# Patient Record
Sex: FEMALE | Race: WHITE | NOT HISPANIC OR LATINO | Employment: FULL TIME | URBAN - METROPOLITAN AREA
[De-identification: names, ages, dates, MRNs, and addresses within clinical notes are randomized per-mention and may not be internally consistent; named-entity substitution may affect disease eponyms.]

---

## 2020-08-23 ENCOUNTER — APPOINTMENT (OUTPATIENT)
Dept: URGENT CARE | Facility: URGENT CARE | Age: 25
End: 2020-08-23
Payer: COMMERCIAL

## 2020-08-23 ENCOUNTER — RESULTS ONLY (OUTPATIENT)
Dept: LAB | Age: 25
End: 2020-08-23

## 2020-08-24 LAB
SARS-COV-2 RNA SPEC QL NAA+PROBE: NOT DETECTED
SPECIMEN SOURCE: NORMAL

## 2021-01-04 ENCOUNTER — HEALTH MAINTENANCE LETTER (OUTPATIENT)
Age: 26
End: 2021-01-04

## 2021-05-21 ENCOUNTER — OFFICE VISIT (OUTPATIENT)
Dept: FAMILY MEDICINE | Facility: CLINIC | Age: 26
End: 2021-05-21
Payer: COMMERCIAL

## 2021-05-21 VITALS
WEIGHT: 151 LBS | HEART RATE: 51 BPM | TEMPERATURE: 98.8 F | HEIGHT: 67 IN | SYSTOLIC BLOOD PRESSURE: 99 MMHG | DIASTOLIC BLOOD PRESSURE: 60 MMHG | RESPIRATION RATE: 16 BRPM | BODY MASS INDEX: 23.7 KG/M2 | OXYGEN SATURATION: 98 %

## 2021-05-21 DIAGNOSIS — Z30.09 ENCOUNTER FOR OTHER GENERAL COUNSELING OR ADVICE ON CONTRACEPTION: Primary | ICD-10-CM

## 2021-05-21 PROBLEM — L70.9 ACNE: Status: ACTIVE | Noted: 2017-08-09

## 2021-05-21 PROCEDURE — 99203 OFFICE O/P NEW LOW 30 MIN: CPT | Performed by: NURSE PRACTITIONER

## 2021-05-21 RX ORDER — CLINDAMYCIN PHOSPHATE 10 UG/ML
LOTION TOPICAL
COMMUNITY
Start: 2020-08-25 | End: 2021-07-06

## 2021-05-21 RX ORDER — NORGESTIMATE AND ETHINYL ESTRADIOL 7DAYSX3 28
KIT ORAL
COMMUNITY
Start: 2021-04-26 | End: 2021-06-04

## 2021-05-21 RX ORDER — TRETINOIN 0.5 MG/G
CREAM TOPICAL
COMMUNITY
Start: 2020-08-25 | End: 2021-07-06

## 2021-05-21 RX ORDER — MAGNESIUM GLUCONATE 30 MG(550)
30 TABLET ORAL
COMMUNITY

## 2021-05-21 SDOH — HEALTH STABILITY: MENTAL HEALTH: HOW OFTEN DO YOU HAVE 6 OR MORE DRINKS ON ONE OCCASION?: NOT ASKED

## 2021-05-21 SDOH — HEALTH STABILITY: MENTAL HEALTH: HOW MANY STANDARD DRINKS CONTAINING ALCOHOL DO YOU HAVE ON A TYPICAL DAY?: NOT ASKED

## 2021-05-21 SDOH — HEALTH STABILITY: MENTAL HEALTH: HOW OFTEN DO YOU HAVE A DRINK CONTAINING ALCOHOL?: NOT ASKED

## 2021-05-21 ASSESSMENT — MIFFLIN-ST. JEOR: SCORE: 1447.56

## 2021-05-21 NOTE — PROGRESS NOTES
"    Assessment & Plan     Encounter for other general counseling or advice on contraception  Patient interested in Paragard.  Counseled.  Patient is scheduled for 6/4.  Advised on need for UPT upon arrival and on ibuprofen prior to procedure. Will do pap smear at time of visit.     15 minutes spent on the date of the encounter doing chart review, history and exam, documentation and further activities per the note         Return in about 2 weeks (around 6/4/2021) for IUD insertion.    OVIDIO Denton CNP  [facility]    Amadou Hatfield is a 26 year old who presents for the following health issues     HPI     IUD Consult.        Review of Systems   Constitutional, HEENT, cardiovascular, pulmonary, gi and gu systems are negative, except as otherwise noted.      Objective    BP 99/60 (BP Location: Left arm, Patient Position: Sitting, Cuff Size: Adult Regular)   Pulse 51   Temp 98.8  F (37.1  C) (Tympanic)   Resp 16   Ht 1.702 m (5' 7\")   Wt 68.5 kg (151 lb)   LMP 05/17/2021 (Approximate)   SpO2 98%   BMI 23.65 kg/m    Body mass index is 23.65 kg/m .  Physical Exam   GENERAL: healthy, alert and no distress  PSYCH: mentation appears normal, affect normal/bright            "

## 2021-05-21 NOTE — PATIENT INSTRUCTIONS
At [facility], we strive to deliver an exceptional experience to you, every time we see you. If you receive a survey, please complete it as we do value your feedback.  If you have MyChart, you can expect to receive results automatically within 24 hours of their completion.  Your provider will send a note interpreting your results as well.   If you do not have MyChart, you should receive your results in about a week by mail.    Your care team:                            Family Medicine Internal Medicine   MD Earle Palacio MD Shantel Branch-Fleming, MD Srinivasa Vaka, MD Katya Belousova, PARENE Boateng, APRN CNP    Avinash Paul, MD Pediatrics   Vasu Go, PARENE Lerma, CNP MD Rebecca Goldberg APRN CNP   MD Tita Rome MD Deborah Mielke, MD Traci Naidu, APRN [facility]      Clinic hours: Monday - Thursday 7 am-6 pm; Fridays 7 am-5 pm.   Urgent care: Monday - Friday 10 am- 8 pm; Saturday and Sunday 9 am-5 pm.    Clinic: [phone]       [facility]: Monday - Thursday 8 am - 7 pm; Friday 8 am - 6 pm  [facility]: [phone]     Use [email or web address] for 24/7 diagnosis and treatment of dozens of conditions.

## 2021-06-04 ENCOUNTER — OFFICE VISIT (OUTPATIENT)
Dept: FAMILY MEDICINE | Facility: CLINIC | Age: 26
End: 2021-06-04
Payer: COMMERCIAL

## 2021-06-04 VITALS
WEIGHT: 150 LBS | HEART RATE: 60 BPM | HEIGHT: 67 IN | SYSTOLIC BLOOD PRESSURE: 106 MMHG | BODY MASS INDEX: 23.54 KG/M2 | OXYGEN SATURATION: 99 % | TEMPERATURE: 99 F | DIASTOLIC BLOOD PRESSURE: 71 MMHG

## 2021-06-04 DIAGNOSIS — Z12.4 SCREENING FOR MALIGNANT NEOPLASM OF CERVIX: ICD-10-CM

## 2021-06-04 DIAGNOSIS — Z97.5 IUD (INTRAUTERINE DEVICE) IN PLACE: ICD-10-CM

## 2021-06-04 DIAGNOSIS — Z30.430 ENCOUNTER FOR IUD INSERTION: Primary | ICD-10-CM

## 2021-06-04 LAB — HCG UR QL: NEGATIVE

## 2021-06-04 PROCEDURE — G0145 SCR C/V CYTO,THINLAYER,RESCR: HCPCS | Performed by: NURSE PRACTITIONER

## 2021-06-04 PROCEDURE — 58300 INSERT INTRAUTERINE DEVICE: CPT | Performed by: NURSE PRACTITIONER

## 2021-06-04 PROCEDURE — 99207 PR DROP WITH A PROCEDURE: CPT | Performed by: NURSE PRACTITIONER

## 2021-06-04 PROCEDURE — 81025 URINE PREGNANCY TEST: CPT | Performed by: NURSE PRACTITIONER

## 2021-06-04 RX ORDER — COPPER 313.4 MG/1
1 INTRAUTERINE DEVICE INTRAUTERINE CONTINUOUS
Status: ACTIVE
Start: 2021-06-04

## 2021-06-04 RX ORDER — COPPER 313.4 MG/1
1 INTRAUTERINE DEVICE INTRAUTERINE CONTINUOUS
Start: 2021-06-04

## 2021-06-04 RX ADMIN — COPPER 1 EACH: 313.4 INTRAUTERINE DEVICE INTRAUTERINE at 14:13

## 2021-06-04 ASSESSMENT — MIFFLIN-ST. JEOR: SCORE: 1443.03

## 2021-06-04 NOTE — PROGRESS NOTES
Assessment & Plan     Encounter for IUD insertion  See procedure note.   - HCG Qual, Urine ([id number])  - INSERTION INTRAUTERINE DEVICE  - paragard intrauterine copper device; 1 each by Intrauterine route continuous  - paragard intrauterine copper IUD device 1 each    Screening for malignant neoplasm of cervix  - Pap imaged thin layer screen reflex to HPV if ASCUS - recommend age 25 - 29    Prescription drug management  25 minutes spent on the date of the encounter doing chart review, history and exam, documentation and further activities per the note       Patient Instructions   We placed an IUD (intrauterine device) today for birth control.  You may experience some cramping like a period for the next few days and spotting for the next month or so.    After that, if you have the Copper IUD, your period pattern should normalize though it may be heavier and the cramping stronger than normal.  This is normal and you can use ibuprofen over the counter to help with that.      Nothing in the vagina (tampons, intercourse, etc.) for the next 48 hours.    If you have the hormonal IUD (Mirena, Liletta, Merry) your bleeding pattern will remain irregular but lessen over time.  The first month, you may spot more often.  Some women don't get their period at all, some will get it every so often, but it will not be a regular, monthly cycle like you may be used to.    I recommend using condoms for the next seven days to protect against pregnancy and always to protect against sexually transmitted infections.      The risk for expelling the IUD is highest in the first month, therefore, come back in 4 weeks for a string check.  You should also check for the strings regularly yourself.    Congratulations on choosing such an effective birth control method!  Please contact us at any time with questions or concerns!        Return in about 4 weeks (around 7/2/2021) for string check.    OVIDIO Denton [facility]  "WELLINGTON Hatfield is a 26 year old who presents for the following health issues     HPI     IUD placement, Paragard, previously counseled by myself.       Review of Systems   Constitutional, HEENT, cardiovascular, pulmonary, gi and gu systems are negative, except as otherwise noted.      Objective    /71 (BP Location: Left arm, Patient Position: Sitting, Cuff Size: Adult Regular)   Pulse 60   Temp 99  F (37.2  C) (Tympanic)   Ht 1.702 m (5' 7\")   Wt 68 kg (150 lb)   LMP 06/02/2021 (Approximate)   SpO2 99%   BMI 23.49 kg/m    Body mass index is 23.49 kg/m .  Physical Exam   GENERAL: healthy, alert and no distress   (female): normal female external genitalia, normal urethral meatus, vaginal mucosa, normal cervix/adnexa/uterus without masses or discharge  PSYCH: mentation appears normal, affect normal/bright    No results found for this or any previous visit (from the past 24 hour(s)).            "

## 2021-06-04 NOTE — PATIENT INSTRUCTIONS
We placed an IUD (intrauterine device) today for birth control.  You may experience some cramping like a period for the next few days and spotting for the next month or so.    After that, if you have the Copper IUD, your period pattern should normalize though it may be heavier and the cramping stronger than normal.  This is normal and you can use ibuprofen over the counter to help with that.      Nothing in the vagina (tampons, intercourse, etc.) for the next 48 hours.    If you have the hormonal IUD (Mirena, Liletta, Merry) your bleeding pattern will remain irregular but lessen over time.  The first month, you may spot more often.  Some women don't get their period at all, some will get it every so often, but it will not be a regular, monthly cycle like you may be used to.    I recommend using condoms for the next seven days to protect against pregnancy and always to protect against sexually transmitted infections.      The risk for expelling the IUD is highest in the first month, therefore, come back in 4 weeks for a string check.  You should also check for the strings regularly yourself.    Congratulations on choosing such an effective birth control method!  Please contact us at any time with questions or concerns!

## 2021-06-04 NOTE — PROCEDURES
The patient meets and is agreeable to the following conditions:    She is not interested in conception in the near future.  She currently is in a stable, monogamous relationship.  There is no previous history of pelvic inflammatory disease.  There is no previous history of ectopic pregnancy.  She is willing to check monthly for the IUD string.  She is at least 8 weeks post-partum.  There is no history of unresolved abnormal uterine bleeding.  There is no history of an unresolved abnormal PAP smear.  She has no history of Diaz's disease or an allergy to copper (for ParaGard).  She has no history of diabetes, AIDS, leukemia, IV drug use or chronic steroid use.  She is willing to return annually for pelvic exams  She denies the possibility of pregnancy.  Pregnancy test today is negative.    The following risks were discussed with the patient:  Possibility of pregnancy and ectopic pregnancy.  Possibility of pelvic inflammatory disease, particularly with new partners.  Risk of uterine perforation or IUD expulsion.  Possibility of difficult removal.  Spotting or heavy bleeding.  Cramping, pain or infection during or after insertion.    The patient was given patient information on the IUD and the patient education brochure from the .  The patient has given consent to proceed with placement of the IUD.  A written consent form is present in the chart.  She wishes to proceed.    PROCEDURE:  IUD Placement    Type of IUD: ParaGard    The patient was placed in a dorsal lithotomy potion and a bimanual exam was performed to determine the position of the uterus.  The speculum was placed, cervix was identified and cleaned with betadine three times.   A single tooth tenaculum was applied to the anterior lip of the cervix for stabilization.  The uterus was sounded to 7.5 cm and removed. (Target sound depth is 6.5 cm to 8.5 cm.)   The IUD insertion device was inserted without difficulty into the uterus to manufacturers  recommendationsunder sterile conditions to the sounding depth. The IUD string was then cut to 2.0 cm.    The patient tolerated this procedure without immediate complication and minimal bleeding.  The patient is to return or call immediately for any unexplained fever, abdominal or pelvic pain, excessive bleeding, possibility of pregnancy, foul-smelling discharge, sense that the IUD has been expelled.    IUD DEVICE:  LOT # [id number]  EXP Date 01/2026    NDC:    MICHELLE:  [id number]    OVIDIO Denton CNP

## 2021-06-08 LAB
COPATH REPORT: NORMAL
PAP: NORMAL

## 2021-06-24 ENCOUNTER — VIRTUAL VISIT (OUTPATIENT)
Dept: DERMATOLOGY | Facility: CLINIC | Age: 26
End: 2021-06-24
Payer: COMMERCIAL

## 2021-06-24 DIAGNOSIS — L70.0 ACNE VULGARIS: Primary | ICD-10-CM

## 2021-06-24 PROCEDURE — 99204 OFFICE O/P NEW MOD 45 MIN: CPT | Mod: GQ

## 2021-06-24 NOTE — PROGRESS NOTES
[facility] Dermatology Note  Encounter Date: Jun 24, 2021  Store-and-Forward and Telephone ([phone]). Location of teledermatologist: [facility] [place].  Start time: 0900. End time: 0921.     Dermatology Problem List:  # Acne vulgaris, inflammatory and comedonal   - BPW, tretinoin 0.025%, spironolactone    ____________________________________________    Assessment & Plan:     # Acne vulgaris.  Inflammatory, comedonal, hormonal.  Patient has had acne on and off since puberty.  She recently stopped OCP and started nonhormonal IUD and thinks it has gotten worse.  Especially has worsened since November.  Does not necessarily describe a hormonal component, but the distribution raises suspicion.  Considered doxy, accutane, spironolactone in addition to topicals.  After discussing with patient, we decided to start spironolactone, wait 6 months to assess response and if necessary can consider accutane at that point.   - start BP wash at least every other day   - start tretinoin 0.025% at bedtime  - start spironolactone 50 mg x2 weeks then 100 mg at bedtime   - future: if not clear in 6 months consider accutane  - discussed importance of continued use of sunscreen to help with PIH and scarring       Procedures Performed:    None    Follow-up: 3 month(s) in-person, or earlier for new or changing lesions    Staff and Resident:     Lizett Palmer MD     STAFF: OLIVE     Staff Physician Comments:   I evaluated any available patient photographs with the resident/medical student and I edited the assessment and plan as documented in the note. I was present on the line and participated in the entire telephone call/video visit.    Rashel Singh MD   of Dermatology  Department of Dermatology  [employer]    ____________________________________________    CC: Acne (Alysia is here for treatment of acne. She says it is  the worst its ever been.)    HPI:  Ms. Alysia Cordoba is a(n) 26 year old female who presents today as a new patient for acne.  Has always had acne prone skin.  Tried accutane in HS but did not complete full course.  Improved with OCP but recently stopped for a nonhormonal IUD.  Has tried various topicals including BPW, tretinoin and clindamycin.  Currently is not using anything medicated.  Wears daily sunscreen.  Does not necessarily think it is much worse with period.  Does not want to start with accutane.       Patient is otherwise feeling well, without additional skin concerns.      Labs Reviewed:  N/A    Physical Exam:  Vitals: LMP 06/02/2021 (Approximate)   SKIN: Teledermatology photos were reviewed; image quality and interpretability: acceptable. Image date: 6/22/21.  - inflammatory papules with PIH and possible scarring on the cheeks, jawline  - No other lesions of concern on areas examined.      Medications:  Current Outpatient Medications   Medication     cholecalciferol 50 MCG (2000 UT) CAPS     Ferrous Sulfate (IRON PO)     Magnesium Gluconate 550 MG TABS     Multiple Vitamins-Minerals (ZINC PO)     paragard intrauterine copper device     clindamycin (CLEOCIN T) 1 % external lotion     tretinoin (RETIN-A) 0.05 % external cream     Current Facility-Administered Medications   Medication     paragard intrauterine copper IUD device 1 each      Past Medical/Surgical History:   Patient Active Problem List   Diagnosis     Acne     IUD (intrauterine device) in place     No past medical history on file.    CC Referred Self, MD  No address on file on close of this encounter.

## 2021-06-24 NOTE — NURSING NOTE
Dermatology Rooming Note    Alysia Cordoba's goals for this visit include:   Chief Complaint   Patient presents with     Acne     Alysia is here for treatment of acne. She says it is the worst its ever been.     Teresa Cook, CMA

## 2021-06-24 NOTE — LETTER
6/24/2021       RE: Alysia Cordoba  [address] [address] [place] [address]     Dear Colleague,    Thank you for referring your patient, Alysia Cordoba, to the [facility] [place] at [facility]. Please see a copy of my visit note below.    [facility] Dermatology Note  Encounter Date: Jun 24, 2021  Store-and-Forward and Telephone ([phone]). Location of teledermatologist: [facility] [place].  Start time: 0900. End time: 0921.     Dermatology Problem List:  # Acne vulgaris, inflammatory and comedonal   - BPW, tretinoin 0.025%, spironolactone    ____________________________________________    Assessment & Plan:     # Acne vulgaris.  Inflammatory, comedonal, hormonal.  Patient has had acne on and off since puberty.  She recently stopped OCP and started nonhormonal IUD and thinks it has gotten worse.  Especially has worsened since November.  Does not necessarily describe a hormonal component, but the distribution raises suspicion.  Considered doxy, accutane, spironolactone in addition to topicals.  After discussing with patient, we decided to start spironolactone, wait 6 months to assess response and if necessary can consider accutane at that point.   - start BP wash at least every other day   - start tretinoin 0.025% at bedtime  - start spironolactone 50 mg x2 weeks then 100 mg at bedtime   - future: if not clear in 6 months consider accutane  - discussed importance of continued use of sunscreen to help with PIH and scarring       Procedures Performed:    None    Follow-up: 3 month(s) in-person, or earlier for new or changing lesions    Staff and Resident:     Lizett Palmer MD     STAFF: OLIVE     Staff Physician Comments:   I evaluated any available patient photographs with the resident/medical student and I edited the assessment and plan as documented in the note. I was present on  the line and participated in the entire telephone call/video visit.    Rashel Singh MD   of Dermatology  Department of Dermatology  [facility] [employer]    ____________________________________________    CC: Acne (Alysia is here for treatment of acne. She says it is the worst its ever been.)    HPI:  Ms. Alysia Cordoba is a(n) 26 year old female who presents today as a new patient for acne.  Has always had acne prone skin.  Tried accutane in HS but did not complete full course.  Improved with OCP but recently stopped for a nonhormonal IUD.  Has tried various topicals including BPW, tretinoin and clindamycin.  Currently is not using anything medicated.  Wears daily sunscreen.  Does not necessarily think it is much worse with period.  Does not want to start with accutane.       Patient is otherwise feeling well, without additional skin concerns.      Labs Reviewed:  N/A    Physical Exam:  Vitals: LMP 06/02/2021 (Approximate)   SKIN: Teledermatology photos were reviewed; image quality and interpretability: acceptable. Image date: 6/22/21.  - inflammatory papules with PIH and possible scarring on the cheeks, jawline  - No other lesions of concern on areas examined.      Medications:  Current Outpatient Medications   Medication     cholecalciferol 50 MCG (2000 UT) CAPS     Ferrous Sulfate (IRON PO)     Magnesium Gluconate 550 MG TABS     Multiple Vitamins-Minerals (ZINC PO)     paragard intrauterine copper device     clindamycin (CLEOCIN T) 1 % external lotion     tretinoin (RETIN-A) 0.05 % external cream     Current Facility-Administered Medications   Medication     paragard intrauterine copper IUD device 1 each      Past Medical/Surgical History:   Patient Active Problem List   Diagnosis     Acne     IUD (intrauterine device) in place     No past medical history on file.    CC Referred Self, MD  No address on file on close of this encounter.

## 2021-06-25 RX ORDER — SPIRONOLACTONE 100 MG/1
TABLET, FILM COATED ORAL
Qty: 90 TABLET | Refills: 1 | Status: SHIPPED | OUTPATIENT
Start: 2021-06-25

## 2021-06-25 RX ORDER — BENZOYL PEROXIDE 10 G/100G
SUSPENSION TOPICAL
Qty: 227 G | Refills: 11 | Status: SHIPPED | OUTPATIENT
Start: 2021-06-25

## 2021-06-25 RX ORDER — TRETINOIN 0.25 MG/G
CREAM TOPICAL
Qty: 45 G | Refills: 11 | Status: SHIPPED | OUTPATIENT
Start: 2021-06-25

## 2021-06-25 NOTE — PATIENT INSTRUCTIONS
AM regimen:     1. Start using benzoyl peroxide wash every morning or every other morning   2. For days not using BP wash, use a gentle cleanser (LRP or cerave gentle moisturizing cleanser).  You can also use this before/after gym.  Do not scrub skin on the face.    3. Apply daily non-comedogenic moisturizing lotion.    4. Apply SPF 30 or higher on the face, reapply every 2 hours.      PM regimen:     1. Wash face with gentle cleanser (LRP or cerave gentle moisturizing cleanser)   2. Wait until face is dry.  3. Apply tretinoin 0.025% cream every other night.    - to prevent skin irritation, you can either sandwich between cerave PM moisturizing lotion or you can apply the lotion just on top of the tretinoin.   - if your skin is tolerating the tretinoin, you can increase it to nightly.    - we can increase the strength of tretinoin in 3 months once you build up the tolerance.        Start taking spironolactone 50 mg (1/2 tablet) at night for the first two weeks.  Watch for lightheadedness.  Make sure to drink water.  If you are feeling well at two weeks then you can increase dose to 100 mg (1 tablet) nightly.    - side effects to be aware of include breast tenderness, lightheadedness and period spotting  - less common side effects are elevated potassium and rarely heart arrhythmias

## 2021-07-06 ENCOUNTER — OFFICE VISIT (OUTPATIENT)
Dept: FAMILY MEDICINE | Facility: CLINIC | Age: 26
End: 2021-07-06
Payer: COMMERCIAL

## 2021-07-06 VITALS
HEART RATE: 54 BPM | DIASTOLIC BLOOD PRESSURE: 61 MMHG | OXYGEN SATURATION: 98 % | WEIGHT: 150 LBS | SYSTOLIC BLOOD PRESSURE: 94 MMHG | TEMPERATURE: 97.1 F | BODY MASS INDEX: 23.54 KG/M2 | HEIGHT: 67 IN

## 2021-07-06 DIAGNOSIS — Z97.5 IUD (INTRAUTERINE DEVICE) IN PLACE: Primary | ICD-10-CM

## 2021-07-06 PROCEDURE — 99213 OFFICE O/P EST LOW 20 MIN: CPT | Performed by: NURSE PRACTITIONER

## 2021-07-06 ASSESSMENT — PAIN SCALES - GENERAL: PAINLEVEL: NO PAIN (0)

## 2021-07-06 ASSESSMENT — MIFFLIN-ST. JEOR: SCORE: 1443.03

## 2021-07-06 NOTE — PROGRESS NOTES
"    {PROVIDER CHARTING PREFERENCE:335478}    Amadou Hatfield is a 26 year old who presents for the following health issues {ACCOMPANIED BY STATEMENT (Optional):202059}    HPI     Concern - ***  Onset: ***  Description: ***  Intensity: {.:981522}  Progression of Symptoms:  {.:119659}  Accompanying Signs & Symptoms: ***  Previous history of similar problem: ***  Precipitating factors:        Worsened by: ***  Alleviating factors:        Improved by: ***  Therapies tried and outcome: {NONE DEFAULTED:370558::\" none \"}    {additonal problems for provider to add (Optional):319678}    Review of Systems   {ROS COMP (Optional):922604}      Objective    There were no vitals taken for this visit.  There is no height or weight on file to calculate BMI.  Physical Exam   {Exam List (Optional):011684}    {Diagnostic Test Results (Optional):086536}    {AMBULATORY ATTESTATION (Optional):403408}        "

## 2021-07-06 NOTE — PROGRESS NOTES
Assessment & Plan     IUD (intrauterine device) in place  Paragard strings visible in vaginal canal. Reviewed monthly string check, indications for return to clinic.           Regular exercise  See Patient Instructions    Return in about 3 months (around 10/6/2021), or if symptoms worsen or fail to improve, for Routine preventive.    OVIDIO Loera CNP  [facility] [place] Hatfield is a 26 year old who presents for the following health issues  HPI     Concern - IUD string check  Patient had Paragard IUD placed 6/4/21.  She has had some mild spotting and cramping, had a heavier than usual menses but otherwise is doing well, no pelvic pain, fever, chills, nausea,or vomiting.      Review of Systems   Constitutional, HEENT, cardiovascular, pulmonary, gi and gu systems are negative, except as otherwise noted.      Objective    There were no vitals taken for this visit.  There is no height or weight on file to calculate BMI.  Physical Exam   GENERAL: healthy, alert and no distress  NECK: no adenopathy, no asymmetry, masses, or scars and thyroid normal to palpation  RESP: lungs clear to auscultation - no rales, rhonchi or wheezes  CV: regular rate and rhythm, normal S1 S2, no S3 or S4, no murmur, click or rub, no peripheral edema and peripheral pulses strong  ABDOMEN: soft, nontender, no hepatosplenomegaly, no masses and bowel sounds normal   (female): normal female external genitalia, normal urethral meatus, vaginal mucosa, normal cervix/adnexa/uterus without masses or discharge, White IUD strings visible in vaginal canal, no need for trimming of the strings.  MS: no gross musculoskeletal defects noted, no edema  SKIN: no suspicious lesions or rashes  PSYCH: mentation appears normal, affect normal/bright

## 2021-07-06 NOTE — PATIENT INSTRUCTIONS
At [facility], we strive to deliver an exceptional experience to you, every time we see you. If you receive a survey, please complete it as we do value your feedback.  If you have MyChart, you can expect to receive results automatically within 24 hours of their completion.  Your provider will send a note interpreting your results as well.   If you do not have MyChart, you should receive your results in about a week by mail.    Your care team:                            Family Medicine Internal Medicine   MD Earle Palacio MD Shantel Branch-Fleming, MD Srinivasa Vaka, MD Katya Belousova, PARosanaC  Berenice Boateng, APRN CNP    Avinash Paul, MD Pediatrics   Vasu Go, PARosanaC  Bella Lerma, CNP MD Rebecca Goldberg APRN CNP   MD Tita Rome MD Deborah Mielke, MD Traci Naidu, APRN [facility]      Clinic hours: Monday - Thursday 7 am-6 pm; Fridays 7 am-5 pm.   Urgent care: Monday - Friday 10 am- 8 pm; Saturday and Sunday 9 am-5 pm.    Clinic: [phone]       [facility]: Monday - Thursday 8 am - 7 pm; Friday 8 am - 6 pm  [facility]: [phone]     Use www.[email or web address] for 24/7 diagnosis and treatment of dozens of conditions.    Patient Education     Birth Control: IUD (Intrauterine Device)    The IUD (intrauterine device) is small, flexible, and T-shaped. A trained healthcare provider places it in the uterus. The IUD is one of the most effective birth control methods. It's also reversible. This means it can be removed at any time by a trained healthcare provider. New IUDs are safe and don't have the risks of older types of IUDs.   Pregnancy rates  Talk to your healthcare provider about the effectiveness of this birth control method.  Types of IUDs  IUD insertion is done in the healthcare provider s office. Two types of IUDs are available:    The copper IUD releases a small amount  of copper into the uterus. The copper makes it harder for sperm to reach the egg. The device works for at least 10 years.    The progestin IUD releases a hormone called progestin. It causes changes in the uterus to help prevent pregnancy. The device works for 3 to 5 years, depending on which device is chosen. It may be recommended for women who have anemia or heavy and painful periods.  IUDs have thin strings that hang from the opening of the uterus into the vagina. This lets you check that the IUD stays in place.   Things to know about IUDs    IUDs can be used by women who have never been pregnant or by women with a history of sexually transmitted infections (STIs) or tubal pregnancy.    It won't move from the uterus to any other part of the body.    There is a slight risk of the device coming out of the vagina (expulsion).    It may not work in women who have an abnormally shaped uterus.    A copper IUD may cause heavier periods and cramping.    Progestin IUD may cause light periods or no periods at all (irregular bleeding or spotting is possible and normal during first 3 to 6 months).    If you get a sexually transmitted infection with an IUD in place, symptoms may be more severe.    What to report to your healthcare provider  Be sure your healthcare provider knows if you have:    A sexually transmitted infection (STI) or possible STI    Liver problems    Blood clots (for progestin IUD only)    Breast cancer or a history of breast cancer (progestin IUD only)  Katherin last reviewed this educational content on 5/1/2020 2000-2021 [employer]. All rights reserved. This information is not intended as a substitute for professional medical care. Always follow your healthcare professional's instructions.

## 2021-08-10 ENCOUNTER — E-VISIT (OUTPATIENT)
Dept: URGENT CARE | Facility: CLINIC | Age: 26
End: 2021-08-10
Payer: COMMERCIAL

## 2021-08-10 DIAGNOSIS — Z20.822 SUSPECTED COVID-19 VIRUS INFECTION: ICD-10-CM

## 2021-08-10 DIAGNOSIS — J02.9 SORE THROAT: ICD-10-CM

## 2021-08-10 LAB
DEPRECATED S PYO AG THROAT QL EIA: NEGATIVE
GROUP A STREP BY PCR: NOT DETECTED
SARS-COV-2 RNA RESP QL NAA+PROBE: NEGATIVE

## 2021-08-10 PROCEDURE — 87651 STREP A DNA AMP PROBE: CPT

## 2021-08-10 PROCEDURE — 99421 OL DIG E/M SVC 5-10 MIN: CPT | Performed by: EMERGENCY MEDICINE

## 2021-08-10 PROCEDURE — U0005 INFEC AGEN DETEC AMPLI PROBE: HCPCS

## 2021-08-10 PROCEDURE — U0003 INFECTIOUS AGENT DETECTION BY NUCLEIC ACID (DNA OR RNA); SEVERE ACUTE RESPIRATORY SYNDROME CORONAVIRUS 2 (SARS-COV-2) (CORONAVIRUS DISEASE [COVID-19]), AMPLIFIED PROBE TECHNIQUE, MAKING USE OF HIGH THROUGHPUT TECHNOLOGIES AS DESCRIBED BY CMS-2020-01-R: HCPCS

## 2021-08-10 NOTE — PATIENT INSTRUCTIONS
Dear Alysia Cordoba,    Your symptoms show that you may have coronavirus (COVID-19). This illness can cause fever, cough and trouble breathing. Many people get a mild case and get better on their own. Some people can get very sick.    Because you also reported sore throat I would like to also test you for Strep Throat to determine if we need to treat you for that as well.    What should I do?  We would like to test you for Covid-19 virus and Strep Throat. I have placed orders for these tests.     For all employees or close contacts (except [employer] and [employer] - see below), go to your [employer] home page and scroll down to the section that says  You have an appointment that needs to be scheduled  and click the large green button that says  Schedule Now  and follow the steps to find the next available opening.  It is important that when you are asked what the reason for your appointment is that you mention you need BOTH Covid and Strep tests.  tests.     If you are unable to complete these steps or if you cannot find any available times, please call [phone] to schedule employee testing.     [employer] employees or close contacts, please call [phone].   [employer]) employees or close contacts call [phone].    Return to work/school/ guidance:  Please let your workplace manager and staffing office know when your quarantine ends     We can t give you an exact date as it depends on the above. You can calculate this on your own or work with your manager/staffing office to calculate this. (For example if you were exposed on 10/4, you would have to quarantine for 14 full days. That would be through 10/18. You could return on 10/19.)      If you receive a positive COVID-19 test result, follow the guidance of the those who are giving you the results. Usually the return to work is 10 (or in some cases 20 days from symptom onset.) If you work at [employer], you must also be cleared by  Employee Occupational Health and Safety to return to work.        If you receive a negative COVID-19 test result and did not have a high risk exposure to someone with a known positive COVID-19 test, you can return to work once you're free of fever for 24 hours without fever-reducing medication and your symptoms are improving or resolved.      If you receive a negative COVID-19 test and If you had a high risk exposure to someone who has tested positive for COVID-19 then you can return to work 14 days after your last contact with the positive individual    Note: If you have ongoing exposure to the covid positive person, this quarantine period may be more than 14 days. (For example, if you are continued to be exposed to your child who tested positive and cannot isolate from them, then the quarantine of 7-14 days can't start until your child is no longer contagious. This is typically 10 days from onset of the child's symptoms. So the total duration may be 17-24 days in this case.)    Sign up for [employer].   We know it's scary to hear that you might have COVID-19. We want to track your symptoms to make sure you're okay over the next 2 weeks. Please look for an email from [employer]--this is a free, online program that we'll use to keep in touch. To sign up, follow the link in the email you will receive. Learn more at [email or web address]    How can I take care of myself?    Get lots of rest. Drink extra fluids (unless a doctor has told you not to)    Take Tylenol (acetaminophen) or ibuprofen for fever or pain. If you have liver or kidney problems, ask your family doctor if it's okay to take Tylenol o ibuprofen    If you have other health problems (like cancer, heart failure, an organ transplant or severe kidney disease): Call your specialty clinic if you don't feel better in the next 2 days.    Know when to call 911. Emergency warning signs include:  o Trouble breathing or shortness of breath  o Pain  or pressure in the chest that doesn't go away  o Feeling confused like you haven't felt before, or not being able to wake up  o Bluish-colored lips or face    Where can I get more information?  [facility] - About COVID-19:   [email or web address]    CDC - What to Do If You're Sick:   www.cdc.gov/coronavirus/2019-ncov/about/steps-when-sick.html      August 10, 2021  RE:  Alysia Cordoba                                                                                                                  [address] [address] [address]      To whom it may concern:    I evaluated Alysia Cordoba on August 10, 2021. Alysia Cordoba should be excused from work/school.     They should let their workplace manager and staffing office know when their quarantine ends.    We can not give an exact date as it depends on the information below. They can calculate this on their own or work with their manager/staffing office to calculate this. (For example if they were exposed on 10/04, they would have to quarantine for 14 full days. That would be through 10/18. They could return on 10/19.)    Quarantine Guidelines:      If patient receives a positive COVID-19 test result, they should follow the guidance of those who are giving the results. Usually the return to work is 10 (or in some cases 20 days from symptom onset.) If they work at [facility], they must be cleared by Employee Occupational Health and Safety to return to work.        If patient receives a negative COVID-19 test result and did not have a high risk exposure to someone with a known positive COVID-19 test, they can return to work once they're free of fever for 24 hours without fever-reducing medication and their symptoms are improving or resolved.      If patient receives a negative COVID-19 test and if they had a high risk exposure to someone who has tested positive for COVID-19 then they can return to work 14 days after their last contact  with the positive individual    Note: If there is ongoing exposure to the covid positive person, this quarantine period may be longer than 14 days. (For example, if they are continually exposed to their child, who tested positive and cannot isolate from them, then the quarantine of 7-14 days can't start until their child is no longer contagious. This is typically 10 days from onset to the child's symptoms. So the total duration may be 17-24 days in this case.)     Sincerely,  Álvaro Pham MD

## 2021-10-11 ENCOUNTER — HEALTH MAINTENANCE LETTER (OUTPATIENT)
Age: 26
End: 2021-10-11

## 2021-10-11 ENCOUNTER — MYC MEDICAL ADVICE (OUTPATIENT)
Dept: FAMILY MEDICINE | Facility: CLINIC | Age: 26
End: 2021-10-11
Payer: COMMERCIAL

## 2021-10-21 ENCOUNTER — OFFICE VISIT (OUTPATIENT)
Dept: DERMATOLOGY | Facility: CLINIC | Age: 26
End: 2021-10-21
Payer: COMMERCIAL

## 2021-10-21 DIAGNOSIS — L70.0 ACNE VULGARIS: Primary | ICD-10-CM

## 2021-10-21 PROCEDURE — 99213 OFFICE O/P EST LOW 20 MIN: CPT | Mod: GC

## 2021-10-21 RX ORDER — SPIRONOLACTONE 100 MG/1
100 TABLET, FILM COATED ORAL DAILY
Qty: 90 TABLET | Refills: 3 | Status: SHIPPED | OUTPATIENT
Start: 2021-10-21

## 2021-10-21 RX ORDER — TRETINOIN 0.25 MG/G
CREAM TOPICAL
Qty: 45 G | Refills: 11 | Status: SHIPPED | OUTPATIENT
Start: 2021-10-21 | End: 2021-10-23

## 2021-10-21 ASSESSMENT — PAIN SCALES - GENERAL: PAINLEVEL: NO PAIN (0)

## 2021-10-21 NOTE — LETTER
"10/21/2021       RE: Alysia Cordoba  [address] [address] [facility] [address]     Dear Colleague,    Thank you for referring your patient, Alysia Cordoba, to the [facility] [place] at [facility]. Please see a copy of my visit note below.    [facility] Dermatology Note  Encounter Date: Oct 21, 2021  Clinic visit     Dermatology Problem List:  # Acne vulgaris, inflammatory and comedonal   - BP gel, tretinoin 0.05%, spironolactone    ____________________________________________    Assessment & Plan:     # Acne vulgaris.  Improving with just mild superficial comedonal acne and PIE today.  Patient happy with current treatment plan.  We will increase her tretinoin strength today.    - continue BP gel   - start tretinoin 0.05% at bedtime  - continue spironolactone 100 mg at bedtime, reviewed side effects, handout provided  - discussed importance of continued use of sunscreen to help with PIE and scarring, especially now that patient is moving to [place]       Procedures Performed:    None    Follow-up: 6 month(s) in-person, or earlier for new or changing lesions    Staff and Resident:     Lizett Palmer MD     The patient was seen and staffed with Dr. Rishi MD   I, Anca Blackwell MD, saw this patient with the resident and agree with the resident s findings and plan of care as documented in the resident s note.      ____________________________________________    CC: Acne (Alysia is here today for acne. She states \" my acne is getting a lot better\")    HPI:  Ms. Alysia Cordoba is a(n) 26 year old female who presents today as a return patient for acne.  Seen 3 months ago, at the time was RX'd BP wash, tretinoin 0.025%, spironolactone 100 mg daily.      Today, patient states significant improvement, pleased with results.  Using tretinoin 0.025% nightly, spironolactone 100 mg daily, and BP gel (BP wash bleached her linens " and she found this unavoidable). Wears daily sunscreen.  Today mostly notices just some redness at former sites of acne spots.  Not getting many new lesions.      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         Patient is otherwise feeling well, without additional skin concerns.      Labs Reviewed:  N/A    Physical Exam:  Vitals: There were no vitals taken for this visit.  SKIN: Focused exam of the face   - few superficial comedonal lesions on the cheeks, chin; very few active lesions, mostly PIE cheeks, jawline  - No other lesions of concern on areas examined.      Medications:  Current Outpatient Medications   Medication     benzoyl peroxide (PANOXYL) 10 % external liquid     Ferrous Sulfate (IRON PO)     Magnesium Gluconate 550 MG TABS     Multiple Vitamins-Minerals (ZINC PO)     paragard intrauterine copper device     spironolactone (ALDACTONE) 100 MG tablet     tretinoin (RETIN-A) 0.025 % external cream     Current Facility-Administered Medications   Medication     paragard intrauterine copper IUD device 1 each      Past Medical/Surgical History:   Patient Active Problem List   Diagnosis     Acne     IUD (intrauterine device) in place     No past medical history on file.    CC Referred Self, MD  No address on file on close of this encounter.

## 2021-10-21 NOTE — PROGRESS NOTES
"[facility] Dermatology Note  Encounter Date: Oct 21, 2021  Clinic visit     Dermatology Problem List:  # Acne vulgaris, inflammatory and comedonal   - BP gel, tretinoin 0.05%, spironolactone    ____________________________________________    Assessment & Plan:     # Acne vulgaris.  Improving with just mild superficial comedonal acne and PIE today.  Patient happy with current treatment plan.  We will increase her tretinoin strength today.    - continue BP gel   - start tretinoin 0.05% at bedtime  - continue spironolactone 100 mg at bedtime, reviewed side effects, handout provided  - discussed importance of continued use of sunscreen to help with PIE and scarring, especially now that patient is moving to [place]       Procedures Performed:    None    Follow-up: 6 month(s) in-person, or earlier for new or changing lesions    Staff and Resident:     Lizett Palmer MD     The patient was seen and staffed with Dr. Rishi MD   I, Anca Blackwell MD, saw this patient with the resident and agree with the resident s findings and plan of care as documented in the resident s note.      ____________________________________________    CC: Acne (lAysia is here today for acne. She states \" my acne is getting a lot better\")    HPI:  Ms. Alysia Cordoba is a(n) 26 year old female who presents today as a return patient for acne.  Seen 3 months ago, at the time was RX'd BP wash, tretinoin 0.025%, spironolactone 100 mg daily.      Today, patient states significant improvement, pleased with results.  Using tretinoin 0.025% nightly, spironolactone 100 mg daily, and BP gel (BP wash bleached her linens and she found this unavoidable). Wears daily sunscreen.  Today mostly notices just some redness at former sites of acne spots.  Not getting many new lesions.      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         Patient is otherwise feeling well, without additional skin concerns.      Labs " Reviewed:  N/A    Physical Exam:  Vitals: There were no vitals taken for this visit.  SKIN: Focused exam of the face   - few superficial comedonal lesions on the cheeks, chin; very few active lesions, mostly PIE cheeks, jawline  - No other lesions of concern on areas examined.      Medications:  Current Outpatient Medications   Medication     benzoyl peroxide (PANOXYL) 10 % external liquid     Ferrous Sulfate (IRON PO)     Magnesium Gluconate 550 MG TABS     Multiple Vitamins-Minerals (ZINC PO)     paragard intrauterine copper device     spironolactone (ALDACTONE) 100 MG tablet     tretinoin (RETIN-A) 0.025 % external cream     Current Facility-Administered Medications   Medication     paragard intrauterine copper IUD device 1 each      Past Medical/Surgical History:   Patient Active Problem List   Diagnosis     Acne     IUD (intrauterine device) in place     No past medical history on file.    CC Referred Self, MD  No address on file on close of this encounter.

## 2021-10-21 NOTE — NURSING NOTE
"Dermatology Rooming Note    Alysia Cordoba's goals for this visit include:   Chief Complaint   Patient presents with     Acne     Alysia is here today for acne. She states \" my acne is getting a lot better\"     CHERRY Patrick  "

## 2021-10-21 NOTE — PATIENT INSTRUCTIONS
AM regimen:     1. Continue with gentle cleanser every morning. Do not scrub skin on the face.    2. Apply BP gel   3. Apply daily non-comedogenic moisturizing lotion.    4. Apply SPF 30 or higher on the face, reapply every 2 hours.    (Can combine steps 3&4 with moisturizer containing SPF - recommend Cerave AM with SPF 30)    PM regimen:     1. Wash face with gentle cleanser  2. Wait until face is dry.  3. Apply tretinoin 0.05% cream every other night.    - to prevent skin irritation, you can either sandwich between cerave PM moisturizing lotion or you can apply the lotion just on top of the tretinoin.   - if your skin is tolerating the tretinoin, you can increase it to nightly.      Continue spironolactone 100 mg daily.  Watch for lightheadedness.  Make sure to drink water.    - side effects to be aware of include breast tenderness, lightheadedness and period spotting  - less common side effects are elevated potassium and rarely heart arrhythmias     If you need an appointment sooner than next summer send Poikos message and we will do virtual.

## 2021-10-23 RX ORDER — TRETINOIN 0.5 MG/G
CREAM TOPICAL AT BEDTIME
Qty: 45 G | Refills: 11 | Status: SHIPPED | OUTPATIENT
Start: 2021-10-23

## 2021-11-05 NOTE — TELEPHONE ENCOUNTER
Form printed and placed in provider's bin in red folder to address.    Lillie Elmore,   [facility]

## 2021-11-08 NOTE — TELEPHONE ENCOUNTER
Patient did not technically have a preventative visit.  We did contraception visits in May, June, and July.  I think she needs a regular physical in order to accurately fill out this form.  pelase notify her and assist with scheduling.   OVIDIO Denton CNP

## 2021-11-23 NOTE — TELEPHONE ENCOUNTER
"Form received back from provider, and placed in \"waiting to hear back from patient\" stefania.    Lillie Elmore,   [facility]    "

## 2021-11-26 NOTE — TELEPHONE ENCOUNTER
Patient called back today 11/26/21 and stated does not need the forms anymore. Ok to close this encounter.     Teresa Brennan RN, BSN

## 2021-11-26 NOTE — TELEPHONE ENCOUNTER
Called pt and lvm to call back to see if this still needs to be completed. Waiting on pt response.

## 2022-01-06 NOTE — TELEPHONE ENCOUNTER
1/6/2022 placed form in the shred box.  Caty Luong [facility]  2nd Floor  Primary Care

## 2022-01-30 ENCOUNTER — HEALTH MAINTENANCE LETTER (OUTPATIENT)
Age: 27
End: 2022-01-30

## 2022-03-27 ENCOUNTER — HEALTH MAINTENANCE LETTER (OUTPATIENT)
Age: 27
End: 2022-03-27

## 2022-06-16 ENCOUNTER — OFFICE VISIT (OUTPATIENT)
Dept: DERMATOLOGY | Facility: CLINIC | Age: 27
End: 2022-06-16
Payer: COMMERCIAL

## 2022-06-16 DIAGNOSIS — L70.0 ACNE VULGARIS: Primary | ICD-10-CM

## 2022-06-16 PROCEDURE — 99214 OFFICE O/P EST MOD 30 MIN: CPT | Mod: GC

## 2022-06-16 RX ORDER — SPIRONOLACTONE 100 MG/1
150 TABLET, FILM COATED ORAL DAILY
Qty: 135 TABLET | Refills: 3 | Status: SHIPPED | OUTPATIENT
Start: 2022-06-16

## 2022-06-16 ASSESSMENT — PAIN SCALES - GENERAL: PAINLEVEL: NO PAIN (0)

## 2022-06-16 NOTE — PROGRESS NOTES
[facility] Dermatology Note  Encounter Date: Jun 16, 2022  Clinic visit     Dermatology Problem List:  # Acne vulgaris, inflammatory and comedonal   - BP gel, tretinoin 0.05%, spironolactone    ____________________________________________    Assessment & Plan:     # Acne vulgaris, flaring around period.  Not as consistent with retinoid.  Trying to optimize regimen to prevent period breakouts; discussed OCPs, accutane and increased dose of spironolactone which she opts for.  She also will try to be more consistent and push with frequency of tretinoin.    - continue BP gel   - will try to be more consistent regimen of tretinoin 0.05% at bedtime  - increase spironolactone to 150 mg at bedtime, reviewed side effects  - discussed importance of continued use of sunscreen to help with PIE and scarring    # PIE and acne scarring   - will add to list for resident cosmetics clinic     Procedures Performed:    None    Follow-up: 6 month(s) in-person, or earlier for new or changing lesions    Staff and Resident:     Lizett Palmer MD     The patient was seen and staffed with Dr. Candida MD     I have personally examined this patient and agree with the resident doctor's documentation and plan of care. I have reviewed and amended the resident's note above. The documentation accurately reflects my clinical observations, diagnoses, treatment and follow-up plans.     Yessi Tirado MD  Dermatology Staff    ____________________________________________    CC: Acne (Alysia is following up on acne, reports improvement)    HPI:  Ms. Alysia Cordoba is a(n) 27 year old female who presents today as a return patient for acne.  Seen in October 2021 at that time we added tretinoin 0.05% to spironolactone 100 mg daily.  Wears daily sunscreen.  Also using BP gel.  She thinks acne overall pretty well controlled sometimes breakouts around period.  Today one on chin, upper cutaneous lip and the right temple.  Uses BP  for spot treatment.  She has tried OCP in the past but did not find this beneficial for the acne.      Patient is otherwise feeling well, without additional skin concerns.      Labs Reviewed:  N/A    Physical Exam:  Vitals: There were no vitals taken for this visit.  SKIN: Focused exam of the face   - few small acneiform papules on the right temple, left chin, right upper cutaneous lip; PIE cheeks improved from prior mild scarring on cheeks   - No other lesions of concern on areas examined.      Medications:  Current Outpatient Medications   Medication     benzoyl peroxide (PANOXYL) 10 % external liquid     Calcium-Magnesium-Zinc 333-133-5 MG TABS per tablet     paragard intrauterine copper device     spironolactone (ALDACTONE) 100 MG tablet     tretinoin (RETIN-A) 0.025 % external cream     tretinoin (RETIN-A) 0.05 % external cream     Ferrous Sulfate (IRON PO)     Magnesium Gluconate 550 MG TABS     Multiple Vitamins-Minerals (ZINC PO)     spironolactone (ALDACTONE) 100 MG tablet     Current Facility-Administered Medications   Medication     paragard intrauterine copper IUD device 1 each      Past Medical/Surgical History:   Patient Active Problem List   Diagnosis     Acne     IUD (intrauterine device) in place     No past medical history on file.    CC Referred Self, MD  No address on file on close of this encounter.

## 2022-06-16 NOTE — PROGRESS NOTES
[facility] Dermatology Note  Encounter Date: Jun 16, 2022  Clinic visit     Dermatology Problem List:  # Acne vulgaris, inflammatory and comedonal   - BP gel, tretinoin 0.05%, spironolactone    ____________________________________________    Assessment & Plan:     # Acne vulgaris.  Improving with just mild superficial comedonal acne and PIE today.  Patient happy with current treatment plan.  We will increase her tretinoin strength today.    - continue BP gel   - start tretinoin 0.05% at bedtime  - continue spironolactone 100 mg at bedtime, reviewed side effects, handout provided  - discussed importance of continued use of sunscreen to help with PIE and scarring, especially now that patient is moving to [place]       Procedures Performed:    None    Follow-up: 6 month(s) in-person, or earlier for new or changing lesions    Staff and Resident:     Lizett Palmer MD     The patient was seen and staffed with  ***, MD       ____________________________________________    CC: No chief complaint on file.    HPI:  Ms. Alysia Cordoba is a(n) 27 year old female who presents today as a return patient for acne.  Seen 3 months ago, at the time was RX'd BP wash, tretinoin 0.025%, spironolactone 100 mg daily.      Today, patient states significant improvement, pleased with results.  Using tretinoin 0.025% nightly, spironolactone 100 mg daily, and BP gel (BP wash bleached her linens and she found this unavoidable). Wears daily sunscreen.  Today mostly notices just some redness at former sites of acne spots.  Not getting many new lesions.      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         Patient is otherwise feeling well, without additional skin concerns.      Labs Reviewed:  N/A    Physical Exam:  Vitals: There were no vitals taken for this visit.  SKIN: Focused exam of the face   - few superficial comedonal lesions on the cheeks, chin; very few active lesions, mostly PIE cheeks,  jawline  - No other lesions of concern on areas examined.      Medications:  Current Outpatient Medications   Medication     benzoyl peroxide (PANOXYL) 10 % external liquid     Ferrous Sulfate (IRON PO)     Magnesium Gluconate 550 MG TABS     Multiple Vitamins-Minerals (ZINC PO)     paragard intrauterine copper device     spironolactone (ALDACTONE) 100 MG tablet     spironolactone (ALDACTONE) 100 MG tablet     tretinoin (RETIN-A) 0.025 % external cream     tretinoin (RETIN-A) 0.05 % external cream     Current Facility-Administered Medications   Medication     paragard intrauterine copper IUD device 1 each      Past Medical/Surgical History:   Patient Active Problem List   Diagnosis     Acne     IUD (intrauterine device) in place     No past medical history on file.    CC Referred Self, MD  No address on file on close of this encounter.

## 2022-06-16 NOTE — NURSING NOTE
Dermatology Rooming Note    Alysia Cordoba's goals for this visit include:   Chief Complaint   Patient presents with     Acne     Alysia is following up on acne, reports improvement     Margo Olmstead, EMT

## 2022-06-16 NOTE — LETTER
Date:June 16, 2022      Patient was self referred, no letter generated. Do not send.        [facility] Information

## 2022-06-16 NOTE — LETTER
6/16/2022       RE: Alysia Cordoba  [address] [facility] [address] [facility] [place] [address]     Dear Colleague,    Thank you for referring your patient, Alysia Cordoba, to the [facility] [place] at [facility]. Please see a copy of my visit note below.    [facility] Dermatology Note  Encounter Date: Jun 16, 2022  Clinic visit     Dermatology Problem List:  # Acne vulgaris, inflammatory and comedonal   - BP gel, tretinoin 0.05%, spironolactone    ____________________________________________    Assessment & Plan:     # Acne vulgaris, flaring around period.  Not as consistent with retinoid.  Trying to optimize regimen to prevent period breakouts; discussed OCPs, accutane and increased dose of spironolactone which she opts for.  She also will try to be more consistent and push with frequency of tretinoin.    - continue BP gel   - will try to be more consistent regimen of tretinoin 0.05% at bedtime  - increase spironolactone to 150 mg at bedtime, reviewed side effects  - discussed importance of continued use of sunscreen to help with PIE and scarring    # PIE and acne scarring   - will add to list for resident cosmetics clinic     Procedures Performed:    None    Follow-up: 6 month(s) in-person, or earlier for new or changing lesions    Staff and Resident:     Lizett aPlmer MD     The patient was seen and staffed with Dr. Candida MD     I have personally examined this patient and agree with the resident doctor's documentation and plan of care. I have reviewed and amended the resident's note above. The documentation accurately reflects my clinical observations, diagnoses, treatment and follow-up plans.     Yessi Tirado MD  Dermatology Staff    ____________________________________________    CC: Acne (Alysia is following up on acne, reports improvement)    HPI:  Ms. Alysia Cordoba is a(n) 27 year old female who presents today as a  return patient for acne.  Seen in October 2021 at that time we added tretinoin 0.05% to spironolactone 100 mg daily.  Wears daily sunscreen.  Also using BP gel.  She thinks acne overall pretty well controlled sometimes breakouts around period.  Today one on chin, upper cutaneous lip and the right temple.  Uses BP for spot treatment.  She has tried OCP in the past but did not find this beneficial for the acne.      Patient is otherwise feeling well, without additional skin concerns.      Labs Reviewed:  N/A    Physical Exam:  Vitals: There were no vitals taken for this visit.  SKIN: Focused exam of the face   - few small acneiform papules on the right temple, left chin, right upper cutaneous lip; PIE cheeks improved from prior mild scarring on cheeks   - No other lesions of concern on areas examined.      Medications:  Current Outpatient Medications   Medication     benzoyl peroxide (PANOXYL) 10 % external liquid     Calcium-Magnesium-Zinc 333-133-5 MG TABS per tablet     paragard intrauterine copper device     spironolactone (ALDACTONE) 100 MG tablet     tretinoin (RETIN-A) 0.025 % external cream     tretinoin (RETIN-A) 0.05 % external cream     Ferrous Sulfate (IRON PO)     Magnesium Gluconate 550 MG TABS     Multiple Vitamins-Minerals (ZINC PO)     spironolactone (ALDACTONE) 100 MG tablet     Current Facility-Administered Medications   Medication     paragard intrauterine copper IUD device 1 each      Past Medical/Surgical History:   Patient Active Problem List   Diagnosis     Acne     IUD (intrauterine device) in place     No past medical history on file.    CC Fabiola Scott MD  No address on file on close of this encounter.      Again, thank you for allowing me to participate in the care of your patient.      Sincerely,    Lizett Palmer MD

## 2022-07-12 ENCOUNTER — TELEPHONE (OUTPATIENT)
Dept: DERMATOLOGY | Facility: CLINIC | Age: 27
End: 2022-07-12

## 2022-07-12 NOTE — TELEPHONE ENCOUNTER
Attempted to call patient to schedule follow up in the Dermatology Clinic per Dr Palmer last visit on 6/16/22 checkout comment dispositions. Left message with Dermatology number for patient to call back to schedule. Send letter/ MyChart.    Schedule return in about 6 months (around 12/15/2022).

## 2022-09-24 ENCOUNTER — HEALTH MAINTENANCE LETTER (OUTPATIENT)
Age: 27
End: 2022-09-24

## 2022-12-15 ENCOUNTER — OFFICE VISIT (OUTPATIENT)
Dept: DERMATOLOGY | Facility: CLINIC | Age: 27
End: 2022-12-15
Payer: COMMERCIAL

## 2022-12-15 DIAGNOSIS — L70.0 ACNE VULGARIS: Primary | ICD-10-CM

## 2022-12-15 PROCEDURE — 99214 OFFICE O/P EST MOD 30 MIN: CPT | Mod: GC | Performed by: STUDENT IN AN ORGANIZED HEALTH CARE EDUCATION/TRAINING PROGRAM

## 2022-12-15 RX ORDER — TRETINOIN 0.25 MG/G
CREAM TOPICAL
Qty: 45 G | Refills: 11 | Status: SHIPPED | OUTPATIENT
Start: 2022-12-15

## 2022-12-15 ASSESSMENT — PAIN SCALES - GENERAL: PAINLEVEL: NO PAIN (0)

## 2022-12-15 NOTE — NURSING NOTE
Dermatology Rooming Note    Alysia Cordoba's goals for this visit include:   Chief Complaint   Patient presents with     Derm Problem     Alysia is here for an acne follow-up. States condition has worsened since last seen.     Pool Driscoll, EMT

## 2022-12-15 NOTE — PROGRESS NOTES
[facility] Dermatology Note  Encounter Date: Dec 15, 2022  Clinic visit     Dermatology Problem List:  # Acne vulgaris, inflammatory and comedonal   - BP gel, tretinoin 0.05%, spironolactone    ____________________________________________    Assessment & Plan:     # Acne vulgaris, flaring around period.  Not as consistent with retinoid.  Trying to optimize regimen to prevent period breakouts; discussed OCPs, accutane. Instead we will try to be more consistent and push with frequency of tretinoin.    - continue BP gel   - decrease to 0.025% tretinoin every other night as tolerated   - continue spironolactone to 150 mg at bedtime, reviewed side effects  - discussed importance of continued use of sunscreen to help with PIE and scarring  - Start silymarin vitamin C skinceuticals     Procedures Performed:    None    Follow-up: 3-6 month(s) in-person, or earlier for new or changing lesions    Staff and Resident:     Lizett Palmer MD     The patient was seen and staffed with Dr. Morris  ____________________________________________    CC: Derm Problem (Alysia is here for an acne follow-up. States condition has worsened since last seen.)    HPI:  Ms. Alysia Cordoba is a(n) 27 year old female who presents today as a return patient for acne.  She is using spirinolactone 150 mg daily, not using tretinoin 0.05% b/c too drying. She has some superficial acne that is now the main problem rather than inflammatory which is what she came with initially     Patient is otherwise feeling well, without additional skin concerns.      Labs Reviewed:  N/A    Physical Exam:  Vitals: There were no vitals taken for this visit.  SKIN: Focused exam of the face   - few small acneiform papules on face, chin   - inflammatory papule on the left neck   - No other lesions of concern on areas examined.      Medications:  Current Outpatient Medications   Medication     benzoyl peroxide (PANOXYL) 10 % external liquid      Calcium-Magnesium-Zinc 333-133-5 MG TABS per tablet     Ferrous Sulfate (IRON PO)     Magnesium Gluconate 550 MG TABS     Multiple Vitamins-Minerals (ZINC PO)     paragard intrauterine copper device     spironolactone (ALDACTONE) 100 MG tablet     spironolactone (ALDACTONE) 100 MG tablet     spironolactone (ALDACTONE) 100 MG tablet     tretinoin (RETIN-A) 0.025 % external cream     tretinoin (RETIN-A) 0.05 % external cream     Current Facility-Administered Medications   Medication     paragard intrauterine copper IUD device 1 each      Past Medical/Surgical History:   Patient Active Problem List   Diagnosis     Acne     IUD (intrauterine device) in place     No past medical history on file.    CC Referred Self, MD  No address on file on close of this encounter.

## 2022-12-15 NOTE — LETTER
12/15/2022       RE: Alysia Cordoba  [address] [facility] [facility] [address] [facility] [place] [address]     Dear Colleague,    Thank you for referring your patient, Alysia Cordoba, to the [facility] [place] at [facility]. Please see a copy of my visit note below.    [facility] Dermatology Note  Encounter Date: Dec 15, 2022  Clinic visit     Dermatology Problem List:  # Acne vulgaris, inflammatory and comedonal   - BP gel, tretinoin 0.05%, spironolactone    ____________________________________________    Assessment & Plan:     # Acne vulgaris, flaring around period.  Not as consistent with retinoid.  Trying to optimize regimen to prevent period breakouts; discussed OCPs, accutane. Instead we will try to be more consistent and push with frequency of tretinoin.    - continue BP gel   - decrease to 0.025% tretinoin every other night as tolerated   - continue spironolactone to 150 mg at bedtime, reviewed side effects  - discussed importance of continued use of sunscreen to help with PIE and scarring  - Start silymarin vitamin C skinceuticals     Procedures Performed:    None    Follow-up: 3-6 month(s) in-person, or earlier for new or changing lesions    Staff and Resident:     Lizett Palmer MD     The patient was seen and staffed with Dr. Morris  ____________________________________________    CC: Derm Problem (Alysia is here for an acne follow-up. States condition has worsened since last seen.)    HPI:  Ms. Alysia Cordoba is a(n) 27 year old female who presents today as a return patient for acne.  She is using spirinolactone 150 mg daily, not using tretinoin 0.05% b/c too drying. She has some superficial acne that is now the main problem rather than inflammatory which is what she came with initially     Patient is otherwise feeling well, without additional skin concerns.      Labs Reviewed:  N/A    Physical Exam:  Vitals: There were no vitals  taken for this visit.  SKIN: Focused exam of the face   - few small acneiform papules on face, chin   - inflammatory papule on the left neck   - No other lesions of concern on areas examined.      Medications:  Current Outpatient Medications   Medication     benzoyl peroxide (PANOXYL) 10 % external liquid     Calcium-Magnesium-Zinc 333-133-5 MG TABS per tablet     Ferrous Sulfate (IRON PO)     Magnesium Gluconate 550 MG TABS     Multiple Vitamins-Minerals (ZINC PO)     paragard intrauterine copper device     spironolactone (ALDACTONE) 100 MG tablet     spironolactone (ALDACTONE) 100 MG tablet     spironolactone (ALDACTONE) 100 MG tablet     tretinoin (RETIN-A) 0.025 % external cream     tretinoin (RETIN-A) 0.05 % external cream     Current Facility-Administered Medications   Medication     paragard intrauterine copper IUD device 1 each      Past Medical/Surgical History:   Patient Active Problem List   Diagnosis     Acne     IUD (intrauterine device) in place     No past medical history on file.    CC Referred Self, MD  No address on file on close of this encounter.    Attestation signed by Van Morris MD at 12/15/2022  9:44 AM:  I have personally examined this patient and agree with the resident doctor's documentation and plan of care. I have reviewed and amended the resident's note above. The documentation accurately reflects my clinical observations, diagnoses, treatment and follow-up plans.     Van Morris MD  Dermatology Staff        Again, thank you for allowing me to participate in the care of your patient.      Sincerely,    Lizett Palmer MD

## 2022-12-15 NOTE — LETTER
Date:December 15, 2022      Patient was self referred, no letter generated. Do not send.        [facility] Information

## 2023-04-20 ENCOUNTER — OFFICE VISIT (OUTPATIENT)
Dept: DERMATOLOGY | Facility: CLINIC | Age: 28
End: 2023-04-20
Payer: COMMERCIAL

## 2023-04-20 VITALS — DIASTOLIC BLOOD PRESSURE: 67 MMHG | HEART RATE: 69 BPM | SYSTOLIC BLOOD PRESSURE: 111 MMHG

## 2023-04-20 DIAGNOSIS — L70.0 ACNE VULGARIS: Primary | ICD-10-CM

## 2023-04-20 DIAGNOSIS — L90.5 ACNE SCAR: ICD-10-CM

## 2023-04-20 PROCEDURE — 99214 OFFICE O/P EST MOD 30 MIN: CPT | Mod: GC | Performed by: DERMATOLOGY

## 2023-04-20 ASSESSMENT — PAIN SCALES - GENERAL: PAINLEVEL: NO PAIN (0)

## 2023-04-20 NOTE — PATIENT INSTRUCTIONS
"Sun Protection    Recommend sunscreen (more brands also below):  - ISDIMITRIS Eryfotona Actinica Mineral Sunscreen SPF 50+ Zinc Oxide  - ISDIMITRIS Minear Brush SPF 50 (for re-application throughout the day)       Sunscreen   What does \"broad spectrum mean\"?  Broad spectrum sunscreens protect against both UVA and UVB radiation. UVC is filtered out by the ozone layer.     What does SPF mean?   SPF stands for  Sun Protection Factor  and represents the ability to screen only UVB (burning) rays. UVB rays are mostly blocked in all sunscreens, but only those that contain titanium dioxide, zinc oxide, mexoryl or Parsol 1789 (avobenzone) block the UVA spectrum. Even though a sunscreen is labeled  UVA/UVB Protection  that is not entirely accurate because products that only partially protect against UVA can claim to protect against both UVA and UVB.     What SPF should I chose?   Aim to get a sunscreen that is at least sun protection factor (SPF) 30. SPF 15 provides about 92-93% coverage, SPF 30 about 95-97% coverage, and SPF 45 about 98% coverage. That is to say, SPF 30 is not twice as good as SPF 15. The reason why we recommend SPF 30 is because we are usually only putting on half the necessary amount of sunscreen to achieve the advertised protection. That means that it is very possible that your SPF 30 sunscreen is only providing you with SPF 15 coverage based on how much you are applying. SPF 15 (92-93% coverage) is the absolute minimal that we recommend. Similarly, the benefit of sunscreens with SPF higher than 50 is that even if you put on less than the required amount, you are likely still getting good protection (ex: even if you apply only half the recommended amount of , it should still provide you with an SPF of 50).     How much should I apply?  If covering your whole body, you should be using 30 grams, or one ounce, which is how much is in one shot glass! That s a THICK layer! May times, you are only applying half " the recommended amount, which means that you are only getting half the SPF (for example, you may be using SPF 30 but if you're only applying half the recommended amount, you're only getting SPF 15 protection.      When do I need to wear sunscreen?  Every day, rain or shine! Even on a rainy day or a day when you are only indoors, you are still being exposed harmful UV radiation from the sun. We usually recommend physical/mineral sunscreens (active ingredient is titanium dioxide or zinc oxide) as these ingredients have been around for many years, there is no concern of them being absorbed into the bloodstream, and they are coral reef friendly! However, the best sunscreen is the one that you will use everyday.     What about my kids?  Sunscreen is not recommended for infants under the age of 6 months. Use clothing, shade and sun avoidance for small infants. For kids older than 6 months, we recommend that you should use only mineral/physical sunscreens that have zinc oxide as the active ingredient. Sun-protective clothing and hats are also important for people of all ages.     Sun protective clothing and Resources   Coolibar (www.coolibar.Cell Genesys)  Enanta Pharmaceuticals (Storypanda)  Athleta (wwwSellvana)  Conisus (wwwKyp)  Carve Designs (Audience) - affordable  Skinz (Arkamiskinz.com)    Long sleeve - Bing Cool DRI UPF 50 or [place] PFG UPF 50  Hoodie - [place] PFG UPF 50  Swimshirt/Rash Guard - Patrizia UPF 50 (on Amazon)  Neck - Outdoor Research Ubertubes (www.outdoorresearch.com)      Do I need tinted sunscreen?  There is more and more research showing that visible light can also lead to discoloration (such as melasma). Tinted sunscreens (which contain iron oxides) protect against visible light as an added bonus.     What brands do you recommend?  Physical/Mineral Sunscreens (in no particular order)  Elta MD UV Physical Broad-Spectrum SPF 41 Sunscreen (Tinted, $33)      Skin Ceuticals Physical Fusion  "UV Defense SPF 50 (Tinted, $34)  Unsun Mineral Tinted Face Sunscreen (Tinted, with 2 shade ranges, $29)  It Cosmetics CC+ Cream with SPF 50+ (Tinted, can also double as foundation/coverage -- great range of shades, $40)  Biossance Squalane + Zinc Sheer Mineral Sunscreen SPF 30 PA +++ (goes on white then blends in, $30)  Cerave 100% Mineral Sunscreen SPF 50 Face (good for sensitive skin, $15)  La Roche Posay Anthelios Mineral Zinc Oxide Sunscreen SPF 50 ($35)  La Roche Posay Anthelios Mineral Tinted Sunscreen for Face SPF 50 ($35)  Think Sport Sunscreen (great for sports, though has more of a white cast, $20)  Think Baby Sunscreen (for kids, $21)  Color Science Sunforgettable Total Protection Brush On Shield SPF 50 (Multiple tints, $130)    Chemical Sunscreens  Stephanie Rouleau Weightless Protection SPF 30 ($48)  Amber SPF Brightening Moisturizer ($30)  Urban Skin Complexion Protection Moisturizer SPF 30 ($20)  Total Defense + Repair Broad Spectrum SPF 34 ($68)  Clarins UV PLUS Anti-Pollution Sunscreen Multi-Protection Tint SPF 50 (Multiple tints, $45)  Neutrogena Healthy Skin Glow Sheers Tinted Moisturizer with SPF 20 (Multiple tints, $11)    The ABCDEs of Melanoma  Skin cancer can develop anywhere on the skin. Once a month, take a look at your entire body and note any changing moles or spots. Ask someone for help when checking your skin, especially for hard to see places such as your back. If you notice a mole that looks different from others, or one that changes, enlarges, itches, or bleeds, you should see a dermatologist.    Asymmetry, Border (irregularity), Color (not uniform, changes in color), Diameter (greater than 6 mm which is about the size of a pencil eraser), and Evolving (any changes in pre-existing moles). In short, look for the \"ugly duckling.\" You want all of the spots on your body to look like cousins (like they could be related). If something stands out, take a photo of it and make an appointment to " have it evaluated.     Suggested supplement:   - Heliocare - claims to maintain the skin's ability to protect itself against sun-related effects and aging.   - Not a replacement for sunscreen! Should be used in addition to sunscreen and sun protective measures such as hats, etc.  - Usually available online and at major retailers such as [employer] etc.

## 2023-04-20 NOTE — NURSING NOTE
Dermatology Rooming Note    Alysia Cordoba's goals for this visit include:   Chief Complaint   Patient presents with     Acne     Patient reports improvement of acne with the current treatment. The patient reports no new concerns to discuss regarding their acne treatment.      Sandra Donnelly LPN

## 2023-04-20 NOTE — LETTER
4/20/2023       RE: Alysia Cordoba  [address] [facility] [place] [address]     Dear Colleague,    Thank you for referring your patient, Alysia Cordoba, to the [facility] [place] at [facility]. Please see a copy of my visit note below.    [facility] Dermatology Note  Encounter Date: Apr 20, 2023  Clinic visit     Dermatology Problem List:  Last updated on 4/20/23  # Acne vulgaris  - Silymarin, Tretinoin 0.025%, spironolactone  - Prior TX: tretinoin 0.05%, BP gel   # PIE, mild acne scarring   - Discuss cosmetic options at next visit w/ Caba   ____________________________________________    Assessment & Plan:     # Acne vulgaris, improving. Flare ups are mild around menstruation and Alysia notes significant improvement with consistent retinoid use and addition of Silymarin from [employer]. Will trial a a reduced dose of spironolactone and see how the skin responds. I would otherwise continue this current regimen as generally very good control of her inflammatory and superficial acne. Regarding the PIE and scarring, I suggest we wait a few months and then consider discussing laser vs RF  microneedling for the mild scarring and PIE.   - Continue daily Silymarin   - Continue daily sunscreen, recommend trying a physical blocker for the summer with Zinc or titanium with a tint, handout provided   - Continue tretinoin 0.025% every night as tolerated, beta hydroxyacid PRN on nights not using tretinoin; if tolerating well at follow up, consider ordering tretinoin 0.05% and have Alysia start alternating two days of 0.025%, one day of 0.05% or something similar (go slowly as irritation has been an issue)  - Decrease spironolactone to 100 mg daily, reviewed side effects including potential for birth defects, patient using birth control   - discussed importance of continued use of sunscreen to help with PIE and scarring  - At  follow up visit with Dr. Caba, please do a cosmetics consult discussing alternatives for scarring including CO2re, fraxel or rf microneedling. Could be a good procedure continuity clinic patient if Dr. Morris would agree to staff    Procedures Performed:    None    Follow-up: 3-6 month(s) in-person, or earlier for new or changing lesions    Staff and Resident:     Lizett Palmer MD     The patient was seen and staffed with Dr. Tirado    I have personally examined this patient and agree with the resident doctor's documentation and plan of care. I have reviewed and amended the resident's note above. The documentation accurately reflects my clinical observations, diagnoses, treatment and follow-up plans.     Yessi Tirado MD  Dermatology Staff    ____________________________________________    CC: Acne (Patient reports improvement of acne with the current treatment. The patient reports no new concerns to discuss regarding their acne treatment. )    HPI:  Ms. Alysia Cordoba is a(n) 28 year old female who presents today as a return patient for acne. Seen last on 12/15/22, at that time we had good control of her inflammatory acne and were focusing on the more superficial comedonal acne and the residual that had been flaring around her period. We decided to go down on strength of tretinoin to 0.025% to improve compliance and we also added silymarin. We continued spironolactone 150 mg daily.     Today, Alysia reports improvement, noting fewer breakouts. Still a few pimples around her period, but has noted significant reduction in the smaller superficial lesions since being more consistent w/ tretinoin and also likes the Silymarin a lot.. She is currently icing the face in the morning (sometimes feels washing BID strips her skin), followed by Silymarin skinceuticals and then usually a sunscreen. At night, she is using gentle cleanser and tretinoin 0.025% about 5 nights per week, and beta hydroxyacid 1-2 nights per  week when not using tretinoin. She has been tolerating this regimen well.     Alysia wants to know if we can offer alternative sunscreen that does not cause pilling. She also asks about going down on spironolactone     She does not red spots (PIE) and scarring now that the red spots are fading away     Patient is otherwise feeling well, without additional skin concerns.      Labs Reviewed:  N/A    Physical Exam:  Vitals: /67 (BP Location: Left arm, Patient Position: Sitting)   Pulse 69   SKIN: Focused exam of the face   - few small acneiform papules on left temple and cheek  - overall significant improvement in texture  - PIE, mild scarring bilateral cheeks  - No other lesions of concern on areas examined.      Medications:  Current Outpatient Medications   Medication    benzoyl peroxide (PANOXYL) 10 % external liquid    Calcium-Magnesium-Zinc 333-133-5 MG TABS per tablet    Magnesium Gluconate 550 MG TABS    Multiple Vitamins-Minerals (ZINC PO)    paragard intrauterine copper device    spironolactone (ALDACTONE) 100 MG tablet    spironolactone (ALDACTONE) 100 MG tablet    tretinoin (RETIN-A) 0.025 % external cream    tretinoin (RETIN-A) 0.025 % external cream    tretinoin (RETIN-A) 0.05 % external cream    spironolactone (ALDACTONE) 100 MG tablet     Current Facility-Administered Medications   Medication    paragard intrauterine copper IUD device 1 each      Past Medical/Surgical History:   Patient Active Problem List   Diagnosis    Acne    IUD (intrauterine device) in place     No past medical history on file.    CC Referred Self, MD  No address on file on close of this encounter.

## 2023-04-20 NOTE — PROGRESS NOTES
[facility] Dermatology Note  Encounter Date: Apr 20, 2023  Clinic visit     Dermatology Problem List:  Last updated on 4/20/23  # Acne vulgaris  - Silymarin, Tretinoin 0.025%, spironolactone  - Prior TX: tretinoin 0.05%, BP gel   # PIE, mild acne scarring   - Discuss cosmetic options at next visit w/ Rosales   ____________________________________________    Assessment & Plan:     # Acne vulgaris, improving. Flare ups are mild around menstruation and Alysia notes significant improvement with consistent retinoid use and addition of Silymarin from [employer]. Will trial a a reduced dose of spironolactone and see how the skin responds. I would otherwise continue this current regimen as generally very good control of her inflammatory and superficial acne. Regarding the PIE and scarring, I suggest we wait a few months and then consider discussing laser vs RF  microneedling for the mild scarring and PIE.   - Continue daily Silymarin   - Continue daily sunscreen, recommend trying a physical blocker for the summer with Zinc or titanium with a tint, handout provided   - Continue tretinoin 0.025% every night as tolerated, beta hydroxyacid PRN on nights not using tretinoin; if tolerating well at follow up, consider ordering tretinoin 0.05% and have Alysia start alternating two days of 0.025%, one day of 0.05% or something similar (go slowly as irritation has been an issue)  - Decrease spironolactone to 100 mg daily, reviewed side effects including potential for birth defects, patient using birth control   - discussed importance of continued use of sunscreen to help with PIE and scarring  - At follow up visit with Dr. Caba, please do a cosmetics consult discussing alternatives for scarring including CO2re, fraxel or rf microneedling. Could be a good procedure continuity clinic patient if Dr. Morris would agree to staff    Procedures Performed:    None    Follow-up: 3-6 month(s) in-person, or earlier for  new or changing lesions    Staff and Resident:     Lizett Palmer MD     The patient was seen and staffed with Dr. Tirado    I have personally examined this patient and agree with the resident doctor's documentation and plan of care. I have reviewed and amended the resident's note above. The documentation accurately reflects my clinical observations, diagnoses, treatment and follow-up plans.     Yessi Tirado MD  Dermatology Staff    ____________________________________________    CC: Acne (Patient reports improvement of acne with the current treatment. The patient reports no new concerns to discuss regarding their acne treatment. )    HPI:  Ms. Alysia Cordoba is a(n) 28 year old female who presents today as a return patient for acne. Seen last on 12/15/22, at that time we had good control of her inflammatory acne and were focusing on the more superficial comedonal acne and the residual that had been flaring around her period. We decided to go down on strength of tretinoin to 0.025% to improve compliance and we also added silymarin. We continued spironolactone 150 mg daily.     Today, Alysia reports improvement, noting fewer breakouts. Still a few pimples around her period, but has noted significant reduction in the smaller superficial lesions since being more consistent w/ tretinoin and also likes the Silymarin a lot.. She is currently icing the face in the morning (sometimes feels washing BID strips her skin), followed by Silymarin skinceuticals and then usually a sunscreen. At night, she is using gentle cleanser and tretinoin 0.025% about 5 nights per week, and beta hydroxyacid 1-2 nights per week when not using tretinoin. She has been tolerating this regimen well.     Alysia wants to know if we can offer alternative sunscreen that does not cause pilling. She also asks about going down on spironolactone     She does not red spots (PIE) and scarring now that the red spots are fading away     Patient is  otherwise feeling well, without additional skin concerns.      Labs Reviewed:  N/A    Physical Exam:  Vitals: /67 (BP Location: Left arm, Patient Position: Sitting)   Pulse 69   SKIN: Focused exam of the face   - few small acneiform papules on left temple and cheek  - overall significant improvement in texture  - PIE, mild scarring bilateral cheeks  - No other lesions of concern on areas examined.      Medications:  Current Outpatient Medications   Medication     benzoyl peroxide (PANOXYL) 10 % external liquid     Calcium-Magnesium-Zinc 333-133-5 MG TABS per tablet     Magnesium Gluconate 550 MG TABS     Multiple Vitamins-Minerals (ZINC PO)     paragard intrauterine copper device     spironolactone (ALDACTONE) 100 MG tablet     spironolactone (ALDACTONE) 100 MG tablet     tretinoin (RETIN-A) 0.025 % external cream     tretinoin (RETIN-A) 0.025 % external cream     tretinoin (RETIN-A) 0.05 % external cream     spironolactone (ALDACTONE) 100 MG tablet     Current Facility-Administered Medications   Medication     paragard intrauterine copper IUD device 1 each      Past Medical/Surgical History:   Patient Active Problem List   Diagnosis     Acne     IUD (intrauterine device) in place     No past medical history on file.    CC Referred Self, MD  No address on file on close of this encounter.

## 2023-05-08 ENCOUNTER — HEALTH MAINTENANCE LETTER (OUTPATIENT)
Age: 28
End: 2023-05-08

## 2023-06-05 DIAGNOSIS — L70.0 ACNE VULGARIS: Primary | ICD-10-CM

## 2023-06-05 RX ORDER — TRETINOIN 0.25 MG/G
GEL TOPICAL AT BEDTIME
Qty: 45 G | Refills: 11 | Status: SHIPPED | OUTPATIENT
Start: 2023-06-05

## 2023-06-12 ENCOUNTER — TELEPHONE (OUTPATIENT)
Dept: DERMATOLOGY | Facility: CLINIC | Age: 28
End: 2023-06-12
Payer: COMMERCIAL

## 2023-06-12 NOTE — TELEPHONE ENCOUNTER
LVM for patient regarding scheduling a follow up appt with [facility], with Dr. Shankar. Left my direct number as well as call center number for scheduling.     APPT NOTES:    Follow up 3-6 months around 10/20/2023.       **PLEASE MAKE SURE THIS APPOINTMENT IS WITH DR. SHANKAR**

## 2023-06-14 DIAGNOSIS — L70.0 ACNE VULGARIS: Primary | ICD-10-CM

## 2023-06-16 NOTE — TELEPHONE ENCOUNTER
Requested:SPIRONOLACTONE 100 MG TABLET       Take 1.5 tablets (150 mg) by mouth daily   Last Written Prescription Date: 6-16-22  Last Fill Quantity:135,   # refills: 3  Last Office Visit : 4-20-23 (Dr. Palmer, Staff w/ Dr. Tirado)  Future Office visit:  none  Derm process 2    Last clinic note   Decrease spironolactone to 100 mg daily,   reviewed side effects including potential for birth defects, patient using birth control     Also on current  med list:  spironolactone (ALDACTONE) 100 MG tablet 90 tablet 3 10/21/2021  --   Sig - Route: Take 1 tablet (100 mg) by mouth daily - Oral       spironolactone (ALDACTONE) 100 MG tablet 90 tablet 1 6/25/2021  --   Sig: Take 1/2 tablet (50 mg) for the first two weeks at bedtime.  Then you start taking 1 tablet nightly at bedtime.     Outside lab 10-8-22  CREATININE 0.55 - 1.02 mg/dL 0.93      SODIUM 136 - 145 mmol/L 138    POTASSIUM 3.5 - 5.1 mmol/L 3.5        Routing refill request to provider for review/approval because:  Multiple rx on current med list  Requested rx directions does not match last clinic note

## 2023-06-17 RX ORDER — SPIRONOLACTONE 100 MG/1
100 TABLET, FILM COATED ORAL DAILY
Qty: 90 TABLET | Refills: 1 | Status: SHIPPED | OUTPATIENT
Start: 2023-06-17

## 2024-03-25 ENCOUNTER — TELEPHONE (OUTPATIENT)
Dept: DERMATOLOGY | Facility: CLINIC | Age: 29
End: 2024-03-25
Payer: COMMERCIAL

## 2024-03-25 NOTE — TELEPHONE ENCOUNTER
Writer scheduled for June 4th with Dr. Ochoa and added to the wait list, patient ideally would like appointment scheduled for May. She will call back in May to check in and see if something comes up sooner.    Berenice HAYES RN

## 2024-03-25 NOTE — TELEPHONE ENCOUNTER
[facility] Call Center    Phone Message    May a detailed message be left on voicemail: yes     Reason for Call: Other: Pt called and wanted to schedule an acne f/u with the provider but there were no templates available. Per TE on 6/12/23 they would like for the pt to see Dr. Caba. I was unable to connect her to the care team. Please call her back to schedule. Thanks      Action Taken: Message routed to:  [facility] DERM    Travel Screening: Not Applicable

## 2024-06-04 ENCOUNTER — OFFICE VISIT (OUTPATIENT)
Dept: DERMATOLOGY | Facility: CLINIC | Age: 29
End: 2024-06-04
Payer: COMMERCIAL

## 2024-06-04 DIAGNOSIS — L90.5 ACNE SCAR: ICD-10-CM

## 2024-06-04 DIAGNOSIS — L70.0 ACNE VULGARIS: Primary | ICD-10-CM

## 2024-06-04 PROCEDURE — 99214 OFFICE O/P EST MOD 30 MIN: CPT | Performed by: DERMATOLOGY

## 2024-06-04 RX ORDER — MINOCYCLINE HYDROCHLORIDE 100 MG/1
100 TABLET ORAL 2 TIMES DAILY
Qty: 60 TABLET | Refills: 3 | Status: SHIPPED | OUTPATIENT
Start: 2024-06-04

## 2024-06-04 RX ORDER — SPIRONOLACTONE 100 MG/1
150 TABLET, FILM COATED ORAL DAILY
Qty: 45 TABLET | Refills: 3 | Status: SHIPPED | OUTPATIENT
Start: 2024-06-04

## 2024-06-04 RX ORDER — TRETINOIN 0.25 MG/G
CREAM TOPICAL
Qty: 45 G | Refills: 11 | Status: SHIPPED | OUTPATIENT
Start: 2024-06-04

## 2024-06-04 RX ORDER — PREDNISONE 20 MG/1
60 TABLET ORAL DAILY
Qty: 12 TABLET | Refills: 0 | Status: SHIPPED | OUTPATIENT
Start: 2024-06-04 | End: 2024-06-08

## 2024-06-04 ASSESSMENT — PAIN SCALES - GENERAL: PAINLEVEL: NO PAIN (0)

## 2024-06-04 NOTE — PATIENT INSTRUCTIONS
Spironolactone: Patient drug information     What is this drug used for?   It is used to get rid of extra fluid.   It is used to raise potassium in the body.   It is used to treat heart failure (weak heart).   It is used to treat high blood pressure.   It is used to treat some people with high aldosterone levels.   It is used to treat some kidney problems.   It may be given to you for other reasons. Talk with the doctor.    What do I need to tell my doctor BEFORE I take this drug?   If you have an allergy to spironolactone or any other part of this drug.   If you are allergic to this drug; any part of this drug; or any other drugs, foods, or substances. Tell your doctor about the allergy and what signs you had.   If you have any of these health problems: [place]'s disease or high potassium levels.   If you are taking any of these drugs: Amiloride, eplerenone, or triamterene.  This is not a list of all drugs or health problems that interact with this drug.  Tell your doctor and pharmacist about all of your drugs (prescription or OTC, natural products, vitamins) and health problems. You must check to make sure that it is safe for you to take this drug with all of your drugs and health problems. Do not start, stop, or change the dose of any drug without checking with your doctor.    What are some things I need to know or do while I take this drug?   Tell all of your health care providers that you take this drug. This includes your doctors, nurses, pharmacists, and dentists.   Avoid driving and doing other tasks or actions that call for you to be alert until you see how this drug affects you.   Check your blood pressure as you have been told.   Have blood work checked as you have been told by the doctor. Talk with the doctor.   This drug may affect certain lab tests. Tell all of your health care providers and lab workers that you take this drug.   If you are on a low-salt or salt-free diet, talk with your doctor.    Sometimes, this drug may raise potassium levels in the blood. This can be deadly if it is not treated. The risk is highest in people with diabetes, kidney disease, severe illness, and in older adults. Your doctor will follow you closely to change the dose if needed.   If you are taking a salt substitute that has potassium in it, a potassium-sparing diuretic, or a potassium product, talk with your doctor.   Talk with your doctor before you drink alcohol or use other drugs and natural products that slow your actions.   If you have high blood sugar (diabetes), you will need to watch your blood sugar closely.   Tell your doctor if you are pregnant or plan on getting pregnant. You will need to talk about the benefits and risks of using this drug while you are pregnant.   Tell your doctor if you are breast-feeding. You will need to talk about any risks to your baby.    What are some side effects that I need to call my doctor about right away?  WARNING/CAUTION: Even though it may be rare, some people may have very bad and sometimes deadly side effects when taking a drug. Tell your doctor or get medical help right away if you have any of the following signs or symptoms that may be related to a very bad side effect:   Signs of an allergic reaction, like rash; hives; itching; red, swollen, blistered, or peeling skin with or without fever; wheezing; tightness in the chest or throat; trouble breathing, swallowing, or talking; unusual hoarseness; or swelling of the mouth, face, lips, tongue, or throat.   Signs of fluid and electrolyte problems like mood changes, confusion, muscle pain or weakness, a heartbeat that does not feel normal, very bad dizziness or passing out, fast heartbeat, more thirst, seizures, feeling very tired or weak, not hungry, unable to pass urine or change in the amount of urine produced, dry mouth, dry eyes, or very bad upset stomach or throwing up.   Signs of kidney problems like unable to pass urine,  change in how much urine is passed, blood in the urine, or a big weight gain.   Signs of a very bad skin reaction (White-Adalid syndrome/toxic epidermal necrolysis) like red, swollen, blistered, or peeling skin (with or without fever); red or irritated eyes; or sores in the mouth, throat, nose, or eyes.   Very bad dizziness or passing out.   Feeling confused.   Change in balance.   Lowered interest in sex.   Not able to get or keep an erection.   Fever or chills.   Sore throat.   Any unexplained bruising or bleeding.   Black, tarry, or bloody stools.   Throwing up blood or throw up that looks like coffee grounds.   Period (menstrual) changes.   Breast pain.   For males, enlarged breasts.   Liver problems have rarely happened with this drug. Sometimes, this has been deadly. Call your doctor right away if you have signs of liver problems like dark urine, feeling tired, not hungry, upset stomach or stomach pain, light-colored stools, throwing up, or yellow skin or eyes.  What are some other side effects of this drug?  All drugs may cause side effects. However, many people have no side effects or only have minor side effects. Call your doctor or get medical help if any of these side effects or any other side effects bother you or do not go away:   Diarrhea.   Feeling sleepy.   Dizziness.   Headache.   Upset stomach or throwing up.   Stomach cramps.   Hair loss.    These are not all of the side effects that may occur. If you have questions about side effects, call your doctor. Call your doctor for medical advice about side effects.  You may report side effects to your national health agency.  How is this drug best taken?  Use this drug as ordered by your doctor. Read all information given to you. Follow all instructions closely.  All products:   Take with or without food but take the same way each time. Always take with food or always take on an empty stomach.   Keep taking this drug as you have been told by your  doctor or other health care provider, even if you feel well.   This drug may cause you to pass urine more often. To keep from having sleep problems, try not to take too close to bedtime.  Liquid (suspension):   Shake well before use.   Measure liquid doses carefully. Use the measuring device that comes with this drug. If there is none, ask the pharmacist for a device to measure this drug.    What do I do if I miss a dose?   Take a missed dose as soon as you think about it.   If it is close to the time for your next dose, skip the missed dose and go back to your normal time.   Do not take 2 doses at the same time or extra doses.  How do I store and/or throw out this drug?   Store at room temperature.   Store in a dry place. Do not store in a bathroom.   Keep all drugs in a safe place. Keep all drugs out of the reach of children and pets.   Throw away unused or  drugs. Do not flush down a toilet or pour down a drain unless you are told to do so. Check with your pharmacist if you have questions about the best way to throw out drugs. There may be drug take-back programs in your area.  General drug facts   If your symptoms or health problems do not get better or if they become worse, call your doctor.   Do not share your drugs with others and do not take anyone else's drugs.   Some drugs may have another patient information leaflet. If you have any questions about this drug, please talk with your doctor, nurse, pharmacist, or other health care provider.   If you think there has been an overdose, call your poison control center or get medical care right away. Be ready to tell or show what was taken, how much, and when it happened.      Use of UpToDate is subject to the Subscription and License Agreement.  Topic 65863 Version 157.0  Close  The use of UpToDate is subject to theSubscription and License Agreement.  [employer] drug information & [employer] are subject to the [employer] License Agreement.

## 2024-06-04 NOTE — PROGRESS NOTES
[facility] Dermatology Note  Encounter Date: Jun 4, 2024  Office Visit     Dermatology Problem List:  1.  Acne vulgaris  - Silymarin, Tretinoin 0.025%, spironolactone  - Prior TX: tretinoin 0.05%, BP gel   2. PIE, mild acne scarring     ____________________________________________    Assessment & Plan:    # Acne vulgaris, flaring today; she describes that flare ups occur around menstruation. Given patient's past success with Spironolactone and Tretinoin, discussed with patient that we could consider re-starting this treatment. Also discussed that oral antibiotic treatment may help with clearing her acne more quickly. After discussing treatment options with patient in the context of her upcoming wedding, she elects to pursue the following:    - begin oral minocycline 100 mg twice daily   - re-start Spironolactone 150 mg once daily   - re-start tretinoin 0.025% every other night and increase to every night as tolerated.   - closer to patient's wedding in three months, will start a short Prednisone burst to help clear acne.   - Once patient starts trying for pregnancy, discussed that she can begin using topical Clindamycin and Azelaic acid. Explained that these treatments are both safe during pregnancy.     Procedures Performed:   None    Follow-up: 3 months    Staff and Scribe:   Dat Varma, visiting [facility]    Staff Physician:  I was present with the medical student who participated in the service and in the documentation of the note. I have verified the history and personally performed the physical exam and medical decision making. I agree with the assessment and plan of care as documented in the note.     True Ochoa MD  Staff Dermatologist and Dermatopathologist  , Department of Dermatology   ____________________________________________    CC: Follow-up acne    HPI:  Ms. Alysia Cordoba is a(n) 29 year old female who presents today as a return patient for acne. She  states that her acne has been significantly flaring since last being seen, and thinks that flares correlate with her menstrual cycle. She also reports that she thinks her acne has changed in that she has been developing acne along her jaw line, which she did not previously describe. She discontinued spironolactone last fall after having significant improvement with the medication, but has since began flaring. She also reports that she discontinued use of tretinoin given significant skin irritation. She is currently using a gentle cleanser as well as a salicylic acid wash once weekly. Patient reports that she is getting  in three months and would like to be aggressive with her treatment. She also states that she would like to start a family in the near future.     Patient is otherwise feeling well, without additional skin concerns.    Labs Reviewed:  N/A     Physical Exam:  Vitals: There were no vitals taken for this visit.  SKIN: Acne exam, which includes the fac  - numerous inflammatory papules on both temples, forehead, and bilateral jaw line  - PIE, mild scarring bilateral cheeks  - No other lesions of concern on areas examined.    Medications:  Current Outpatient Medications   Medication Sig Dispense Refill    benzoyl peroxide (PANOXYL) 10 % external liquid Use every other day or daily as morning face wash as tolerated. 227 g 11    Calcium-Magnesium-Zinc 333-133-5 MG TABS per tablet Take 1 tablet by mouth daily      Magnesium Gluconate 550 MG TABS Take 30 mg by mouth      Multiple Vitamins-Minerals (ZINC PO)       paragard intrauterine copper device 1 each by Intrauterine route continuous      spironolactone (ALDACTONE) 100 MG tablet Take 1 tablet (100 mg) by mouth daily 90 tablet 1    spironolactone (ALDACTONE) 100 MG tablet Take 1.5 tablets (150 mg) by mouth daily 135 tablet 3    spironolactone (ALDACTONE) 100 MG tablet Take 1 tablet (100 mg) by mouth daily 90 tablet 3    spironolactone (ALDACTONE) 100  MG tablet Take 1/2 tablet (50 mg) for the first two weeks at bedtime.  Then you start taking 1 tablet nightly at bedtime. 90 tablet 1    tretinoin (RETIN-A) 0.025 % external cream Use every other nigth, increase to nightly as tolerated. 45 g 11    tretinoin (RETIN-A) 0.025 % external cream Start using every other night before bedtime.  Apply thin layer to entire face.  Increase to nightly as tolerated. (Patient taking differently: Start using every other night before bedtime.  Apply thin layer to entire face.  Increase to nightly as tolerated.) 45 g 11    tretinoin (RETIN-A) 0.025 % external gel Apply topically At Bedtime 45 g 11    tretinoin (RETIN-A) 0.05 % external cream Apply topically At Bedtime Use every other night at bedtime.  If tolerating well, increase frequency slowly to nightly.  Make sure to use sunscreen. 45 g 11     Current Facility-Administered Medications   Medication Dose Route Frequency Provider Last Rate Last Admin    paragard intrauterine copper IUD device 1 each  1 each Intrauterine Continuous Bella Lerma PA-C   1 each at 06/04/21 1413      Past Medical History:   Patient Active Problem List   Diagnosis    Acne    IUD (intrauterine device) in place     No past medical history on file.

## 2024-06-04 NOTE — LETTER
6/4/2024       RE: Alysia Cordoba  [id number] [facility] [place] [address]     Dear Colleague,    Thank you for referring your patient, Alysia Cordoba, to the [facility] DERMAT[facility] [place] at [facility]. Please see a copy of my visit note below.    [facility] Dermatology Note  Encounter Date: Jun 4, 2024  Office Visit     Dermatology Problem List:  1.  Acne vulgaris  - Silymarin, Tretinoin 0.025%, spironolactone  - Prior TX: tretinoin 0.05%, BP gel   2. PIE, mild acne scarring     ____________________________________________    Assessment & Plan:    # Acne vulgaris, flaring today; she describes that flare ups occur around menstruation. Given patient's past success with Spironolactone and Tretinoin, discussed with patient that we could consider re-starting this treatment. Also discussed that oral antibiotic treatment may help with clearing her acne more quickly. After discussing treatment options with patient in the context of her upcoming wedding, she elects to pursue the following:    - begin oral minocycline 100 mg twice daily   - re-start Spironolactone 150 mg once daily   - re-start tretinoin 0.025% every other night and increase to every night as tolerated.   - closer to patient's wedding in three months, will start a short Prednisone burst to help clear acne.   - Once patient starts trying for pregnancy, discussed that she can begin using topical Clindamycin and Azelaic acid. Explained that these treatments are both safe during pregnancy.     Procedures Performed:   None    Follow-up: 3 months    Staff and Scribe:   Dat Varma, visiting [facility]    Staff Physician:  I was present with the medical student who participated in the service and in the documentation of the note. I have verified the history and personally performed the physical exam and medical decision making. I agree with the assessment and plan of care as  documented in the note.     True Ochoa MD  Staff Dermatologist and Dermatopathologist  , Department of Dermatology   ____________________________________________    CC: Follow-up acne    HPI:  Ms. Alysia Cordoba is a(n) 29 year old female who presents today as a return patient for acne. She states that her acne has been significantly flaring since last being seen, and thinks that flares correlate with her menstrual cycle. She also reports that she thinks her acne has changed in that she has been developing acne along her jaw line, which she did not previously describe. She discontinued spironolactone last fall after having significant improvement with the medication, but has since began flaring. She also reports that she discontinued use of tretinoin given significant skin irritation. She is currently using a gentle cleanser as well as a salicylic acid wash once weekly. Patient reports that she is getting  in three months and would like to be aggressive with her treatment. She also states that she would like to start a family in the near future.     Patient is otherwise feeling well, without additional skin concerns.    Labs Reviewed:  N/A     Physical Exam:  Vitals: There were no vitals taken for this visit.  SKIN: Acne exam, which includes the fac  - numerous inflammatory papules on both temples, forehead, and bilateral jaw line  - PIE, mild scarring bilateral cheeks  - No other lesions of concern on areas examined.    Medications:  Current Outpatient Medications   Medication Sig Dispense Refill    benzoyl peroxide (PANOXYL) 10 % external liquid Use every other day or daily as morning face wash as tolerated. 227 g 11    Calcium-Magnesium-Zinc 333-133-5 MG TABS per tablet Take 1 tablet by mouth daily      Magnesium Gluconate 550 MG TABS Take 30 mg by mouth      Multiple Vitamins-Minerals (ZINC PO)       paragard intrauterine copper device 1 each by Intrauterine route continuous       spironolactone (ALDACTONE) 100 MG tablet Take 1 tablet (100 mg) by mouth daily 90 tablet 1    spironolactone (ALDACTONE) 100 MG tablet Take 1.5 tablets (150 mg) by mouth daily 135 tablet 3    spironolactone (ALDACTONE) 100 MG tablet Take 1 tablet (100 mg) by mouth daily 90 tablet 3    spironolactone (ALDACTONE) 100 MG tablet Take 1/2 tablet (50 mg) for the first two weeks at bedtime.  Then you start taking 1 tablet nightly at bedtime. 90 tablet 1    tretinoin (RETIN-A) 0.025 % external cream Use every other nigth, increase to nightly as tolerated. 45 g 11    tretinoin (RETIN-A) 0.025 % external cream Start using every other night before bedtime.  Apply thin layer to entire face.  Increase to nightly as tolerated. (Patient taking differently: Start using every other night before bedtime.  Apply thin layer to entire face.  Increase to nightly as tolerated.) 45 g 11    tretinoin (RETIN-A) 0.025 % external gel Apply topically At Bedtime 45 g 11    tretinoin (RETIN-A) 0.05 % external cream Apply topically At Bedtime Use every other night at bedtime.  If tolerating well, increase frequency slowly to nightly.  Make sure to use sunscreen. 45 g 11     Current Facility-Administered Medications   Medication Dose Route Frequency Provider Last Rate Last Admin    paragard intrauterine copper IUD device 1 each  1 each Intrauterine Continuous Bella Lerma PA-C   1 each at 06/04/21 1413   Past Medical History:   Patient Active Problem List   Diagnosis    Acne    IUD (intrauterine device) in place     No past medical history on file.

## 2024-06-04 NOTE — NURSING NOTE
Dermatology Rooming Note    Alysia Cordoba's goals for this visit include:   Chief Complaint   Patient presents with    Acne     Face and neck.  Using a gentle cleanser and moisturizer.  Once a week salicylic acid wash.  Prescribed spirolactone and tretinoin in the past and it seemed to work     Nivia Scanlon CMA

## 2024-06-05 ENCOUNTER — MYC MEDICAL ADVICE (OUTPATIENT)
Dept: DERMATOLOGY | Facility: CLINIC | Age: 29
End: 2024-06-05
Payer: COMMERCIAL

## 2024-07-14 ENCOUNTER — HEALTH MAINTENANCE LETTER (OUTPATIENT)
Age: 29
End: 2024-07-14

## 2024-10-03 DIAGNOSIS — L70.0 ACNE VULGARIS: ICD-10-CM

## 2024-10-08 RX ORDER — SPIRONOLACTONE 100 MG/1
150 TABLET, FILM COATED ORAL DAILY
Qty: 135 TABLET | Refills: 0 | Status: SHIPPED | OUTPATIENT
Start: 2024-10-08

## 2024-10-08 NOTE — TELEPHONE ENCOUNTER
"Medication Requested:  spironolactone (ALDACTONE) 100 MG tablet 45 tablet 3 6/4/2024 -- No   Sig - Route: Take 1.5 tablets (150 mg) by mouth daily - Oral     ----------------------  Last Office Visit : Office Visit  6/4/2024  [facility] Dermatology [facility]      Future Office visit:  0  ----------------------  Per last visit note:  \"Follow-up: 3 months \"    Refill decision: Medication refilled per protocol.      Pass/Fail Protocol Criteria:  spironolactone (ALDACTONE)   Derm-Allergy Refill Process #2     -Refill qty to 6 months from last clinic visit   -Refill with or without scheduled appointment.       "

## 2025-01-08 DIAGNOSIS — L70.0 ACNE VULGARIS: ICD-10-CM

## 2025-01-14 ENCOUNTER — TELEPHONE (OUTPATIENT)
Dept: DERMATOLOGY | Facility: CLINIC | Age: 30
End: 2025-01-14
Payer: COMMERCIAL

## 2025-01-14 RX ORDER — SPIRONOLACTONE 100 MG/1
TABLET, FILM COATED ORAL
Qty: 135 TABLET | Refills: 2 | Status: SHIPPED | OUTPATIENT
Start: 2025-01-14

## 2025-01-14 NOTE — TELEPHONE ENCOUNTER
1/14 Left Voicemail (1st Attempt) and Sent Mychart (1st Attempt) for the patient to call back and schedule the following:    Appointment type: Return Dermatology  Provider: Gabriela  Return date: next available  Specialty phone number: [phone]  Additional appointment(s) needed: n/a  Additonal Notes: n/a

## 2025-01-14 NOTE — TELEPHONE ENCOUNTER
Medication Requested:spironolactone (ALDACTONE) 100 MG tablet        Last Written Prescription Date:  10/8/24  Last Fill Quantity: 135,   # refills: 0  ----------------------  Last Office Visit : 6/4/24 Gabriela YANEZ  Future Office visit:  None  ----------------------      Refill decision: Refill pended and routed to the provider for review/determination due to the following criteria not met: Overdue office visit > 6 months                     Verito B, RN  P Central Nursing/[facility] Flag Triage & Med Refill Team

## (undated) RX ORDER — LIDOCAINE HYDROCHLORIDE AND EPINEPHRINE 10; 10 MG/ML; UG/ML
INJECTION, SOLUTION INFILTRATION; PERINEURAL
Status: DISPENSED
Start: 2025-01-09

## (undated) RX ORDER — LIDOCAINE HYDROCHLORIDE AND EPINEPHRINE 10; 10 MG/ML; UG/ML
INJECTION, SOLUTION INFILTRATION; PERINEURAL
Status: DISPENSED
Start: 2024-10-11